# Patient Record
Sex: MALE | Race: WHITE | NOT HISPANIC OR LATINO | Employment: OTHER | URBAN - METROPOLITAN AREA
[De-identification: names, ages, dates, MRNs, and addresses within clinical notes are randomized per-mention and may not be internally consistent; named-entity substitution may affect disease eponyms.]

---

## 2017-01-24 ENCOUNTER — HOSPITAL ENCOUNTER (OUTPATIENT)
Dept: CT IMAGING | Facility: HOSPITAL | Age: 67
Discharge: HOME | End: 2017-01-24
Admitting: INTERNAL MEDICINE

## 2017-01-24 ENCOUNTER — TRANSCRIBE ORDERS (OUTPATIENT)
Dept: ADMINISTRATIVE | Facility: HOSPITAL | Age: 67
End: 2017-01-24

## 2017-01-24 DIAGNOSIS — R93.5 ABNORMAL CT SCAN, PELVIS: ICD-10-CM

## 2017-01-24 DIAGNOSIS — R94.5 LIVER FUNCTION STUDY, ABNORMAL: ICD-10-CM

## 2017-01-24 DIAGNOSIS — R11.2 NAUSEA AND VOMITING, INTRACTABILITY OF VOMITING NOT SPECIFIED, UNSPECIFIED VOMITING TYPE: ICD-10-CM

## 2017-01-24 DIAGNOSIS — R74.8 ELEVATED AMYLASE AND LIPASE: ICD-10-CM

## 2017-01-24 DIAGNOSIS — R10.9 ABDOMINAL PAIN, UNSPECIFIED LOCATION: ICD-10-CM

## 2017-01-24 DIAGNOSIS — R93.5 ABNORMAL CT SCAN, PELVIS: Primary | ICD-10-CM

## 2017-01-24 DIAGNOSIS — K86.1 CHRONIC RECURRENT PANCREATITIS (HCC): ICD-10-CM

## 2017-01-24 PROCEDURE — 74178 CT ABD&PLV WO CNTR FLWD CNTR: CPT

## 2017-01-24 PROCEDURE — 0 IOPAMIDOL PER 1 ML: Performed by: INTERNAL MEDICINE

## 2017-01-24 PROCEDURE — 82565 ASSAY OF CREATININE: CPT

## 2017-01-24 RX ADMIN — IOPAMIDOL 95 ML: 755 INJECTION, SOLUTION INTRAVENOUS at 16:10

## 2017-01-26 LAB — CREAT BLDA-MCNC: 0.7 MG/DL (ref 0.6–1.3)

## 2017-02-01 ENCOUNTER — APPOINTMENT (OUTPATIENT)
Dept: CT IMAGING | Facility: HOSPITAL | Age: 67
End: 2017-02-01

## 2018-04-12 ENCOUNTER — TRANSCRIBE ORDERS (OUTPATIENT)
Dept: ADMINISTRATIVE | Facility: HOSPITAL | Age: 68
End: 2018-04-12

## 2018-04-12 DIAGNOSIS — R06.00 DYSPNEA, UNSPECIFIED TYPE: Primary | ICD-10-CM

## 2018-04-22 ENCOUNTER — HOSPITAL ENCOUNTER (OUTPATIENT)
Dept: CT IMAGING | Facility: HOSPITAL | Age: 68
Discharge: HOME | End: 2018-04-22
Admitting: FAMILY MEDICINE

## 2018-04-22 DIAGNOSIS — R06.00 DYSPNEA, UNSPECIFIED TYPE: ICD-10-CM

## 2018-04-22 PROCEDURE — 71260 CT THORAX DX C+: CPT

## 2018-04-22 PROCEDURE — 82565 ASSAY OF CREATININE: CPT

## 2018-04-22 PROCEDURE — 25010000002 IOPAMIDOL 61 % SOLUTION: Performed by: FAMILY MEDICINE

## 2018-04-22 RX ADMIN — IOPAMIDOL 85 ML: 612 INJECTION, SOLUTION INTRAVENOUS at 10:30

## 2018-04-23 LAB — CREAT BLDA-MCNC: 0.7 MG/DL (ref 0.6–1.3)

## 2021-06-08 ENCOUNTER — APPOINTMENT (OUTPATIENT)
Dept: PREADMISSION TESTING | Facility: HOSPITAL | Age: 71
End: 2021-06-08

## 2021-06-08 ENCOUNTER — LAB (OUTPATIENT)
Dept: LAB | Facility: HOSPITAL | Age: 71
End: 2021-06-08

## 2021-06-08 ENCOUNTER — TRANSCRIBE ORDERS (OUTPATIENT)
Dept: LAB | Facility: HOSPITAL | Age: 71
End: 2021-06-08

## 2021-06-08 DIAGNOSIS — Z01.812 PRE-OPERATIVE LABORATORY EXAMINATION: Primary | ICD-10-CM

## 2021-06-08 LAB
ANION GAP SERPL CALCULATED.3IONS-SCNC: 8.7 MMOL/L (ref 5–15)
BUN SERPL-MCNC: 7 MG/DL (ref 8–23)
BUN/CREAT SERPL: 8.5 (ref 7–25)
CALCIUM SPEC-SCNC: 9.3 MG/DL (ref 8.6–10.5)
CHLORIDE SERPL-SCNC: 104 MMOL/L (ref 98–107)
CO2 SERPL-SCNC: 26.3 MMOL/L (ref 22–29)
CREAT SERPL-MCNC: 0.82 MG/DL (ref 0.76–1.27)
GFR SERPL CREATININE-BSD FRML MDRD: 93 ML/MIN/1.73
GLUCOSE SERPL-MCNC: 93 MG/DL (ref 65–99)
POTASSIUM SERPL-SCNC: 4.7 MMOL/L (ref 3.5–5.2)
SARS-COV-2 RNA PNL SPEC NAA+PROBE: NOT DETECTED
SODIUM SERPL-SCNC: 139 MMOL/L (ref 136–145)

## 2021-06-08 PROCEDURE — U0005 INFEC AGEN DETEC AMPLI PROBE: HCPCS

## 2021-06-08 PROCEDURE — C9803 HOPD COVID-19 SPEC COLLECT: HCPCS

## 2021-06-08 PROCEDURE — 36415 COLL VENOUS BLD VENIPUNCTURE: CPT | Performed by: SURGERY

## 2021-06-08 PROCEDURE — 80048 BASIC METABOLIC PNL TOTAL CA: CPT | Performed by: SURGERY

## 2021-06-08 PROCEDURE — U0004 COV-19 TEST NON-CDC HGH THRU: HCPCS

## 2022-05-25 ENCOUNTER — OFFICE VISIT (OUTPATIENT)
Dept: NEUROSURGERY | Facility: CLINIC | Age: 72
End: 2022-05-25

## 2022-05-25 ENCOUNTER — HOSPITAL ENCOUNTER (OUTPATIENT)
Dept: GENERAL RADIOLOGY | Facility: HOSPITAL | Age: 72
Discharge: HOME | End: 2022-05-25
Admitting: NEUROLOGICAL SURGERY

## 2022-05-25 VITALS — BODY MASS INDEX: 17.8 KG/M2 | TEMPERATURE: 97.7 F | HEIGHT: 69 IN | WEIGHT: 120.2 LBS

## 2022-05-25 DIAGNOSIS — M51.36 DDD (DEGENERATIVE DISC DISEASE), LUMBAR: ICD-10-CM

## 2022-05-25 DIAGNOSIS — M54.16 LUMBAR RADICULOPATHY: Primary | ICD-10-CM

## 2022-05-25 DIAGNOSIS — M54.16 LUMBAR RADICULOPATHY: ICD-10-CM

## 2022-05-25 PROCEDURE — 72114 X-RAY EXAM L-S SPINE BENDING: CPT

## 2022-05-25 PROCEDURE — 99204 OFFICE O/P NEW MOD 45 MIN: CPT | Performed by: NEUROLOGICAL SURGERY

## 2022-05-25 RX ORDER — PANCRELIPASE 24000; 76000; 120000 [USP'U]/1; [USP'U]/1; [USP'U]/1
CAPSULE, DELAYED RELEASE PELLETS ORAL
COMMUNITY

## 2022-05-25 RX ORDER — IBUPROFEN 800 MG/1
800 TABLET ORAL 3 TIMES DAILY PRN
COMMUNITY
Start: 2022-05-19

## 2022-05-25 RX ORDER — GABAPENTIN 300 MG/1
CAPSULE ORAL
Qty: 90 CAPSULE | Refills: 0 | Status: SHIPPED | OUTPATIENT
Start: 2022-05-25 | End: 2022-06-28 | Stop reason: SDUPTHER

## 2022-05-25 NOTE — PROGRESS NOTES
Patient: Edd Bautista  : 1950    Primary Care Provider: Mukund Aguillon MD    Requesting Provider: As above        History    Chief Complaint: Increasing low back and left leg pain.    History of Present Illness: Mr. Bautista is a 71-year-old retired  who apparently saw my former colleague Dr. Mar years ago for neck problems.  He had numerous injections performed in his neck.  He has had chronic low back pain that has increased.  Over the last 2 to 3 months he has had an increase in his back pain that extends into the side of his left calf.  Symptoms are worse with walking or even lying on his back.  There is really no position in which he can get comfortable.  He has chronically had some numbness in his feet greater on the left.  He has not undergone any formal treatment except for oral steroids which were not helpful.  He smokes heavily.    Review of Systems   Constitutional: Negative for activity change, appetite change, chills, diaphoresis, fatigue, fever and unexpected weight change.   HENT: Positive for hearing loss. Negative for congestion, dental problem, drooling, ear discharge, ear pain, facial swelling, mouth sores, nosebleeds, postnasal drip, rhinorrhea, sinus pressure, sinus pain, sneezing, sore throat, tinnitus, trouble swallowing and voice change.    Eyes: Negative for photophobia, pain, discharge, redness, itching and visual disturbance.   Respiratory: Negative for apnea, cough, choking, chest tightness, shortness of breath, wheezing and stridor.    Cardiovascular: Negative for chest pain, palpitations and leg swelling.   Gastrointestinal: Negative for abdominal distention, abdominal pain, anal bleeding, blood in stool, constipation, diarrhea, nausea, rectal pain and vomiting.   Endocrine: Negative for cold intolerance, heat intolerance, polydipsia, polyphagia and polyuria.   Genitourinary: Negative for decreased urine volume, difficulty urinating, dysuria, enuresis, flank pain,  "frequency, genital sores, hematuria and urgency.   Musculoskeletal: Positive for back pain. Negative for arthralgias, gait problem, joint swelling, myalgias, neck pain and neck stiffness.   Skin: Negative for color change, pallor, rash and wound.   Allergic/Immunologic: Negative for environmental allergies, food allergies and immunocompromised state.   Neurological: Negative for dizziness, tremors, seizures, syncope, facial asymmetry, speech difficulty, weakness, light-headedness, numbness and headaches.   Hematological: Negative for adenopathy. Does not bruise/bleed easily.   Psychiatric/Behavioral: Negative for agitation, behavioral problems, confusion, decreased concentration, dysphoric mood, hallucinations, self-injury, sleep disturbance and suicidal ideas. The patient is not nervous/anxious and is not hyperactive.    All other systems reviewed and are negative.      The patient's past medical history, past surgical history, family history, and social history have been reviewed at length in the electronic medical record.      Physical Exam:   Temp 97.7 °F (36.5 °C)   Ht 175.3 cm (69\")   Wt 54.5 kg (120 lb 3.2 oz)   BMI 17.75 kg/m²   CONSTITUTIONAL: Patient is quite thin and appears somewhat older than his stated age.  MUSCULOSKELETAL:  Straight leg raising is negative.  Julio's Sign is negative.  ROM in the low back is normal.  Tenderness in the back to palpation is not observed.  NEUROLOGICAL:  Orientation, memory, attention span, language function, and cognition have been examined and are intact.  Strength is intact in the lower extremities to direct testing.  Muscle tone is normal throughout.  Station and gait are normal.  Sensation is intact to light touch testing throughout.  Deep tendon reflexes are 1+ and symmetrical.  Coordination is intact.      Medical Decision Making    Data Review:   (All imaging studies were personally reviewed unless stated otherwise)  MRI of the lumbar spine dated 5/5/22 " demonstrates some degenerative disc disease and facet arthropathy.  There may be a trace offset of L3 on L4.  There is some foraminal to slightly extraforaminal disc bulging/osteophyte on the left at L3-4.  Mild bilateral recess narrowing is noted at L4-5.  And there is probably some extraforaminal disc herniation on the left at L5-S1 that could compromise the L5 nerve root.  There is also biforaminal narrowing at L5-S1 greater on the left.    Diagnosis:   1.  Chronic mechanical low back pain.  2.  Possible L5 radiculopathy probably emanating far laterally on the left at L5-S1.    Treatment Options:   I have referred the patient to physical therapy and prescribed gabapentin.  Prior to follow-up in several weeks in our clinic I am going to check flexion-extension upright lateral lumbar spine x-rays to assess for instability.  If he continues to struggle then we might consider a selective epidural injection.       Diagnosis Plan   1. Lumbar radiculopathy     2. DDD (degenerative disc disease), lumbar         Scribed for Noah Farias MD by Enedelia Rosen MYKEL 5/25/2022 12:17 EDT      I, Dr. Farias, personally performed the services described in the documentation, as scribed in my presence, and it is both accurate and complete.

## 2022-06-28 ENCOUNTER — OFFICE VISIT (OUTPATIENT)
Dept: NEUROSURGERY | Facility: CLINIC | Age: 72
End: 2022-06-28

## 2022-06-28 VITALS — BODY MASS INDEX: 18.1 KG/M2 | HEIGHT: 69 IN | TEMPERATURE: 97.5 F | WEIGHT: 122.2 LBS

## 2022-06-28 DIAGNOSIS — M54.16 LEFT LUMBAR RADICULOPATHY: Primary | ICD-10-CM

## 2022-06-28 DIAGNOSIS — M54.16 LUMBAR RADICULOPATHY: ICD-10-CM

## 2022-06-28 DIAGNOSIS — M51.36 DDD (DEGENERATIVE DISC DISEASE), LUMBAR: ICD-10-CM

## 2022-06-28 PROBLEM — M51.369 DDD (DEGENERATIVE DISC DISEASE), LUMBAR: Status: ACTIVE | Noted: 2022-06-28

## 2022-06-28 PROCEDURE — 99213 OFFICE O/P EST LOW 20 MIN: CPT | Performed by: PHYSICIAN ASSISTANT

## 2022-06-28 RX ORDER — GABAPENTIN 300 MG/1
300 CAPSULE ORAL 3 TIMES DAILY
Qty: 45 CAPSULE | Refills: 0 | Status: SHIPPED | OUTPATIENT
Start: 2022-06-28 | End: 2022-10-21 | Stop reason: SDUPTHER

## 2022-06-28 NOTE — PROGRESS NOTES
"Subjective     Chief Complaint: increasing low back and left leg pain    Patient ID: Edd Bautista is a 71 y.o. male is here today for follow-up.    History of Present Illness   Mr. Bautista is a 71-year-old retired  who saw  Dr. Mar years ago for neck problems.  He had numerous injections performed in his neck.  He saw Dr. Farias in May for increasing back pain radiating to his left lateral calf worse with walking or lying on his back but uncomfortable in all positions.MRI of the lumbar spine was reviewed which showed multilevel degenerative changes including a left L5-S1 far lateral disc herniation.  He was referred for physical therapy and gabapentin was prescribed.  Dr. Farias recommended steroid injection if he continued to struggle.  Flexion-extension x-rays were also ordered to assess for instability.     Today, patient states that he attended physical therapy for 3 weeks.  This did not give him any relief.  He has been taking the gabapentin and it is helping somewhat.  He does need a refill today.  He continues to have the leg pain especially when twisting to the left or turning a certain way.  When he does this he feels that his leg almost \"gives out\".  He responded well to cervical injections in the past with his neck pain and is interested in pursuing pain management here.  .    The following portions of the patient's history were reviewed and updated as appropriate: allergies, current medications, past family history, past medical history, past social history, past surgical history and problem list.    Family history:   Family History   Problem Relation Age of Onset   • Brain cancer Mother        Social history:   Social History     Socioeconomic History   • Marital status:    Tobacco Use   • Smoking status: Current Every Day Smoker     Packs/day: 1.00     Years: 35.00     Pack years: 35.00   • Smokeless tobacco: Never Used   Vaping Use   • Vaping Use: Never used   Substance and Sexual " Activity   • Alcohol use: Yes     Alcohol/week: 3.0 standard drinks     Types: 3 Cans of beer per week   • Drug use: Never   • Sexual activity: Defer       Review of Systems   Constitutional: Negative for activity change, appetite change, chills, diaphoresis, fatigue, fever and unexpected weight change.   HENT: Negative for congestion, dental problem, drooling, ear discharge, ear pain, facial swelling, hearing loss, mouth sores, nosebleeds, postnasal drip, rhinorrhea, sinus pressure, sinus pain, sneezing, sore throat, tinnitus, trouble swallowing and voice change.    Eyes: Negative for photophobia, pain, discharge, redness, itching and visual disturbance.   Respiratory: Negative for apnea, cough, choking, chest tightness, shortness of breath, wheezing and stridor.    Cardiovascular: Negative for chest pain, palpitations and leg swelling.   Gastrointestinal: Negative for abdominal distention, abdominal pain, anal bleeding, blood in stool, constipation, diarrhea, nausea, rectal pain and vomiting.   Endocrine: Negative for cold intolerance, heat intolerance, polydipsia, polyphagia and polyuria.   Genitourinary: Negative for decreased urine volume, difficulty urinating, dysuria, enuresis, flank pain, frequency, genital sores, hematuria and urgency.   Musculoskeletal: Positive for back pain. Negative for arthralgias, gait problem, joint swelling, myalgias, neck pain and neck stiffness.   Skin: Negative for color change, pallor, rash and wound.   Allergic/Immunologic: Negative for environmental allergies, food allergies and immunocompromised state.   Neurological: Negative for dizziness, tremors, seizures, syncope, facial asymmetry, speech difficulty, weakness, light-headedness, numbness and headaches.   Hematological: Negative for adenopathy. Does not bruise/bleed easily.   Psychiatric/Behavioral: Negative for agitation, behavioral problems, confusion, decreased concentration, dysphoric mood, hallucinations, self-injury,  "sleep disturbance and suicidal ideas. The patient is not nervous/anxious and is not hyperactive.    All other systems reviewed and are negative.      Objective   Temperature 97.5 °F (36.4 °C), height 175.3 cm (69.02\"), weight 55.4 kg (122 lb 3.2 oz).  Body mass index is 18.04 kg/m².    Physical Exam  CONSTITUTIONAL:   - Patient is well-nourished  - Pleasant and appears stated age.  PSYCHIATRIC:  - Normal mood and affect  - Behavior is normal.  HENT:   Head: Normocephalic and Atraumatic.   Eyes:     - Pupils are equal, round, and reactive to light.     - EOM are normal.   CV:   - Regular rate and rhythm on palpable radial pulse   PULMONARY:   - Speaking in full sentences, breathing non labored  - No wheezing   SKIN:  - Clean, dry and intact   MUSCULOSKELETAL:  - Neck/Back tenderness to palpation is not observed.   - Strength is intact in the upper and lower extremities to direct testing.  - Muscle tone is normal throughout.  - Station and gait are normal.  - ROM in neck/back is normal.  NEUROLOGICAL:  - A&Ox3  - Attention span, language function, and cognition are intact.  - Sensation is intact to light touch testing throughout.  - Deep tendon reflexes are 1+ and symmetrical.    - Lovett's Sign is negative bilaterally.  - Coordination is intact.      Flexion-extension x-rays dated 5/25/2022 were reviewed.  These show degenerative changes as mentioned in MRI, but no evidence of instability in flexion or extension.  Scattered abdominal calcifications were noted.  Radiologist read these as pancreatic calcifications.  These findings were reviewed with the patient in the exam room, and his x-ray results were reviewed with him.  He has known pancreatic issues and sees gastroenterology in [place]      Assessment & Plan   We will refer Mr. Bautista for pain management.  He would like to see by Dr. Brewer here despite his long drive.  His son lives in [place] and they visit often.  We we will also refill his gabapentin.  " He will follow-up with us on an as-needed basis after completing injections.  If his pain persists, we will order a myelogram    Diagnoses and all orders for this visit:    1. Left lumbar radiculopathy (Primary)  -     Ambulatory Referral to Pain Management    2. DDD (degenerative disc disease), lumbar  -     Ambulatory Referral to Pain Management        Return if symptoms worsen or fail to improve.    NATE VelizC

## 2022-07-13 ENCOUNTER — TELEPHONE (OUTPATIENT)
Dept: PAIN MEDICINE | Facility: CLINIC | Age: 72
End: 2022-07-13

## 2022-07-14 NOTE — PROGRESS NOTES
"Chief Complaint: \"Pain in my lower back and left leg\"        History of Present Illness:   Patient: Mr. Edd Bautista, 71 y.o. male   Referring Physician: Kasey Gonzalez PA-C   Reason for Referral: Consultation for chronic intractable lower back and left lower extremity pain.   Pain History: Patient reports a longstanding history of posterior neck and lower back issues. He is a retired . He saw Dr. aMr several years ago for neck problems and had numerous injections with Dr. Paul . He saw Dr. Farias in May 2022 in consultation for increasing lower back pain radiating into the left lateral calf associated with intolerance to standing and walking although he reported to be uncomfortable in all positions. MRI of the lumbar spine on 5/5/2022 revealed diffuse degenerative disc and facet joint disease. There is trace of facet of L3 on L4 with foraminal and slightly extraforaminal disc bulging/osteophyte on the left at L3-L4.  Mild bilateral lateral recess stenosis at L4-L5.  At L5-S1, there is a a far lateral left for disc herniation contributing to severe neuroforaminal stenosis with compromise the left L5 nerve root.  Bilateral foraminal stenosis at L5-S1, greater on the left. Flexion-extension x-rays on 5/25/2022 revealed no evidence of instability in flexion or extension.  Moderate degenerative disc and facet joint disease in the lower lumbar region.   Patient has failed to obtain pain relief with conservative measures for more than 3 months including oral analgesics, physical therapy for several weeks without relief, to name a few. Edd Bautista underwent neurosurgical consultation with Kasey Gonzalez PA-C on 06/28/2022, and was found not to be a surgical candidate.  The neurosurgical team recommended maximization of conservative measures and a follow-up on an as-needed basis after completing injections.  If pain persists, they have discussed the possibility of obtaining a myelogram for surgical delineation. " Pain has progressed in intensity over the past several months.   Pain Description: Constant left lower back pain with intermittent exacerbation, described as aching, burning, sharp, and shooting sensation. Left luteal pain > Left calf > Left lower back  Radiation of Pain: The pain radiates into the left gluteal region, left posterior thigh, posterior calf, and plantar aspect of his left foot and toes. Patient reports low grade right-sided LBP/denies RLE pain.   Pain pattern: Dermatomic   Pain intensity today: 3/10  Average pain intensity last week: 5/10  Pain intensity ranges from: 3/10 to 10/10  Aggravating factors: Pain increases with bending, twisting, extension of the lumbar spine, standing, walking. Patient describes neurogenic claudication. Patient does not use a cane or walker  Alleviating factors: Pain decreases with changing positions, lying down on his left side  Associated Symptoms:   Patient reports pain, numbness, and weakness in the left lower extremity. Numbness in the right foot (Hx neuropathy)   Patient denies any new bladder or bowel problems.   Patient reports difficulties with his balance but denies recent falls.   Pain interferes with ADLs, general activities (ability to walk, stand, transition from different positions), and affects patient's quality of life  Pain interferes with sleep causing sleep fragmentation   Muscle spasms: back  Stiffness: back    Review of previous therapies and additional medical records:  Edd Bautista has already failed the following measures, including:   Conservative Measures: Oral analgesics, topical analgesics, ice, heat, exercise program by PT, physical therapy   Interventional Measures: None for his lower back. Last injection for neck pain > 10 years ago by Dr. Paul  Surgical Measures: No history of previous cervical spine or shoulder surgery. No history of previous lumbar spine or hip surgery   Edd Bautista underwent neurosurgical consultation with Kasey  DALLAS Gonzalez on 06/28/2022, and was found not to be a surgical candidate.  Edd Bautista presents with significant comorbidities including a history of oral cancer, heavy smoker,  In terms of current analgesics, Edd Bautista takes: Gabapentin, ibuprofen  I have reviewed Vijay Report consistent with medication reconciliation.  SOAPP: High Risk     PHQ-2 Depression Screening  Little interest or pleasure in doing things? 0-->not at all   Feeling down, depressed, or hopeless? 0-->not at all   PHQ-2 Total Score 0     Patient screened negative for depression based on a PHQ-2 score of 0 on 07/21/2022    Global Pain Scale 07-19 2022          Pain 18          Feelings 0          Clinical outcomes 20          Activities 20          GPS Total: 58            The [place] Back Pain Disability Scale   0  Not difficult at all 1  Minimally difficult 2  Somewhat difficult 3  Fairly difficult 4  Very difficult 5  Unable to do   Sleep through the night      5   Turn over in bed      5   Get out of bed      5   Make your bed 0        Put on socks (pantyhose) 0        Ride in a car   2      Sit in a chair for several hours   2      Stand up for 20-30 minutes      5   Climb one flight of stairs      5   Walk a few blocks (200-300 yards)       5   Walk several miles      5   Run one block (about 50 yards)      5   Take food out of the refrigerator 0        Reach up to high shelves 0        Move a chair 0        Pull or push heavy doors 0        Bend over to clean the bathtub      5   Throw a ball 0        Carry two bags of groceries     4    Lift and carry a heavy suitcase      5   Total score 58     Review of Diagnostic Studies: I have reviewed the images with the patient and used the images and a tridimensional spine model to explain findings. I have reviewed the report, as well.  MRI of the lumbar spine on 5/5/2022 revealed diffuse degenerative disc and facet joint disease.  There is trace of facet of L3 on L4 with foraminal and  slightly extraforaminal disc bulging/osteophyte on the left at L3-4.  Mild bilateral lateral recess stenosis at L4-L5.  At L5-S1, there is a possible extraforaminal disc herniation on the left side that could compromise the left L5 nerve root.  There is also bilateral foraminal stenosis at L5-S1, greater on the left.  X-rays of the lumbar spine including flexion-extension films on 5/25/2022 revealed no evidence of instability during flexion or extension.  Moderate degenerative disc and facet joint disease in the lower lumbar region.    EMG/NCV by Dr. Woody Hall on 8/7/2015 revealed left ulnar neuropathy at the elbow       Review of Systems   Musculoskeletal: Positive for back pain.   All other systems reviewed and are negative.        Patient Active Problem List   Diagnosis   • DDD (degenerative disc disease), lumbar   • Lumbar disc herniation with radiculopathy   • Lumbar stenosis with neurogenic claudication   • Encounter for smoking cessation counseling   • Physical deconditioning   • Gait disturbance   • Current every day smoker   • Myofascial pain   • Lumbar facet arthropathy   • Hx of transient thrombocytopenia       Past Medical History:   Diagnosis Date   • Acquired thrombocytopenia (HCC)    • Back problem    • Black lung (HCC)    • Cancer (HCC)     oral, now in remission   • Dysfunction of left eustachian tube    • Hernia of abdominal wall    • HL (hearing loss)    • Inguinal hernia    • Malignant tumor of oral cavity (HCC)    • Perforation of left tympanic membrane    • Perforation of nasal septum    • Tinnitus of both ears          Past Surgical History:   Procedure Laterality Date   • ABDOMINAL HERNIA REPAIR  2021   • INNER EAR SURGERY Left 07/15/2019    LEFT MICROSCOPIC TYMPANOPLASTY, POSTAURICULAR HARVEST OF TEMPORALIS FASCIA GRAFT Dr. Lui   • SKIN CANCER EXCISION  2021    Skin and mouth cancer removed   • THROAT SURGERY Left 09/28/2011    Excision of left floor of mouth/ventral tongue  "Canadian's duct sphincter Dr Woody Lui         Family History   Problem Relation Age of Onset   • Brain cancer Mother          Social History     Socioeconomic History   • Marital status:    Tobacco Use   • Smoking status: Current Every Day Smoker     Packs/day: 1.00     Years: 35.00     Pack years: 35.00   • Smokeless tobacco: Never Used   Vaping Use   • Vaping Use: Never used   Substance and Sexual Activity   • Alcohol use: Yes     Alcohol/week: 3.0 standard drinks     Types: 3 Cans of beer per week   • Drug use: Never   • Sexual activity: Defer           Current Outpatient Medications:   •  gabapentin (NEURONTIN) 300 MG capsule, Take 1 capsule by mouth 3 (Three) Times a Day., Disp: 45 capsule, Rfl: 0  •  ibuprofen (ADVIL,MOTRIN) 800 MG tablet, Take 800 mg by mouth 3 (Three) Times a Day As Needed., Disp: , Rfl:   •  pancrelipase, Lip-Prot-Amyl, (Creon) 29142-15930 units capsule delayed-release particles capsule, Creon 24,000-76,000-120,000 unit capsule,delayed release  TAKE ONE CAPSULE BY MOUTH WITH SNACKS AND TAKE TWO CAPSULES BY MOUTH WITH MEALS, Disp: , Rfl:       No Known Allergies      /78   Pulse 80   Temp 95.9 °F (35.5 °C)   Ht 175.3 cm (69.02\")   Wt 56.2 kg (124 lb)   SpO2 98%   BMI 18.30 kg/m²       Physical Exam:  Constitutional: Patient appears well-developed, well-nourished, well-hydrated  HEENT: Head: Normocephalic and atraumatic  Eyes: Conjunctivae and lids are normal  Pupils: Equal, round, reactive to light  Peripheral vascular exam: Femoral: right 2+, left 2+. Posterior tibialis: right 2+ and left 2+. Dorsalis pedis: right 2+ and left 2+. No edema.   Musculoskeletal   Gait and station: Gait evaluation demonstrated shuffling. Able to walk on heels, toes, some difficulties with tandem walking   Lumbar spine: Passive and active range of motion are limited secondary to pain. Extension, flexion, lateral flexion, rotation of the lumbar spine increased and reproduced pain. Lumbar " facet joint loading maneuvers are positive.  Julio test, Gaenslen's test, thigh thrust test, SI compression test, Yeoman's test are negative   Piriformis maneuvers are negative   Hip joints: The range of motion of the hip joints is almost full and without pain   Palpation of the bilateral greater trochanter, unrevealing   Examination of the Iliotibial band: unrevealing   Neurological:   Patient is alert and oriented to person, place, and time.   Speech: Normal.   Cortical function: Normal mental status.   Reflex Scores:  Right brachioradialis: 2+  Left brachioradialis: 2+  Right biceps: 2+  Left biceps: 2+  Right triceps: 2+  Left triceps: 2+  Right patellar: 0+  Left patellar: 0+  Right Achilles: 0+  Left Achilles: 0+  Motor strength: 5/5  Motor Tone: Normal  Involuntary movements: None.   Superficial/Primitive Reflexes: Primitive reflexes were absent.   Right Lovett: Absent  Left Lovett: Absent  Right ankle clonus: Absent  Left ankle clonus: Absent   Babinsky: Absent  Long tract signs: Negative. Straight leg raising test: Negative on the right. Positive on the left at 30-40 degrees with positive Lasegue. Femoral stretch sign: Negative.   Sensory exam: Intact to light touch, intact pain and temperature sensation, intact vibration sensation and normal proprioception.   Coordination: Normal finger to nose and heel to shin. Normal balance and negative Romberg's sign   Skin and subcutaneous tissue: Skin is warm and intact. No rash noted. No cyanosis.   Psychiatric: Judgment and insight: Normal. Recent and remote memory: Intact. Mood and affect: Normal.     ASSESSMENT:   1. Lumbar stenosis with neurogenic claudication    2. Lumbar disc herniation with radiculopathy    3. Lumbar facet arthropathy    4. Myofascial pain    5. Hx of transient thrombocytopenia    6. Current every day smoker    7. Gait disturbance    8. Physical deconditioning    9. Encounter for smoking cessation counseling          PLAN/MEDICAL DECISION  MAKING:  Mr. Edd Bautista, 71 y.o. male presents with a longstanding history of chronic lower back pain refractory to conservative measures. He also has a history of posterior cervicalgia, for which he has seen Dr. Mar several years ago and had several injections with Dr. Paul. He saw Dr. Farias in May 2022 in consultation for increasing lower back pain radiating into the left lateral calf associated with intolerance to standing and walking. MRI of the lumbar spine on 5/5/2022 revealed diffuse moderate degenerative disc and facet joint disease in the lower lumbar region.  There is trace of facet of L3 on L4 with foraminal and slightly extraforaminal disc bulging/osteophyte on the left at L3-L4.  Mild bilateral lateral recess stenosis at L4-L5.  At L5-S1, there is a a far lateral left for disc herniation contributing to severe neuroforaminal stenosis with compromise the left L5 nerve root. Bilateral foraminal stenosis at L5-S1, greater on the left. Flexion-extension x-rays on 5/25/2022 revealed no evidence of instability in flexion or extension.  Patient has failed to obtain pain relief with conservative measures for more than 3 months including oral analgesics, physical therapy for several weeks without relief, to name a few. Edd Bautista underwent neurosurgical consultation with Kasey Gonzalez PA-C on 06/28/2022, and was found not to be a surgical candidate.  The neurosurgical team recommended maximization of conservative measures and a follow-up on an as-needed basis after completing injections.  If pain persists, they have discussed the possibility of obtaining a myelogram for surgical delineation. Pain has progressed in intensity over the past several months. Pain appears to be mostly neuropathic with a left L5-S1 dermatomal distribution. He also has mechanical lower back pain. A comprehensive initial evaluation including history and physical exam were performed including pertinent physiologic and  functional assessment. Patient presents with intractable pain due to the diagnoses listed above. Patient has failed to respond to conservative modalities, as referenced under HPI including the impact of patient's moderate-to-severe pain contributing to significant impairment in daily activities, ADLs, and a negative impact on quality of life. Supporting diagnostic studies of patient's chronic pain condition have been reviewed. I have reviewed all available patient's medical records as well as previous therapies as referenced above. I had a lengthy conversation with . Edd Kelly Freemans regarding his chronic pain condition and potential therapeutic options including risks, benefits, alternative therapies, to name a few. We have discussed using a stepwise approach starting with the shortest or least intense level of treatment, care, or service as determined by the extent required to diagnose and or treat patient's condition. The treatments proposed are consistent with the patient's medical condition and are known to be as safe and effective by current guidelines and standard of care. There is no evidence of absolute contraindications for the proposed procedures under the current circumstances. These treatments are not considered experimental or investigational. The duration and frequency proposed are considered appropriate for the service in accordance with accepted standards of medical practice for the diagnosis and or treatment of the patient's condition and or intended to improve the patient's level of function. These services will be furnished in a setting appropriate to the patient's medical needs and condition. Therefore, I have proposed the following plan:  1. Interventional pain management measures: Patient will be scheduled for diagnostic and therapeutic left L5-S1 and left S1 transforaminal epidural steroid injections. We may repeat epidurals depending on patient's outcome.  Patient will follow-up with   Jarrett thereafter. He also has extensive facet hypertrophy at all levels but mainly at L3-L4 and L4-L5 with chronic axial mechanical lower back pain. We have discussed diagnostic bilateral lumbar medial branch blocks at L2, L3, L4; for bilateral lumbar facet joints at L3-L4, L4-L5, to clarify the origin of chronic refractory mechanical lower back pain. If patient experiences more than 80% relief along with significant improvement the range of motion of the lumbar spine, then, patient will be scheduled for a second set of diagnostic bilateral lumbar medial branch blocks, to then, proceed with bilateral lumbar medial branch rhizotomies. He could also be a candidate for a spinal cord stimulator trial if he is found not to be a surgical candidate  2. Diagnostic studies:  A. CMP, LFTs, CBC, PT, PTT (Hx thrombocytopenia)   B. EMG/NCV of the bilateral and lower extremities   3. Pharmacological measures: Reviewed and discussed;   A. Patient takes gabapentin, ibuprofen  B. Trial with Rheumate one tablet daily  C. Start pyridoxine 100 mg one tablet by mouth daily, #30, take for 30 days, no refills  D. Start alpha lipoid acid 4943-6424 mg per day divided into 3 doses  E. Trial with prilocaine 2%, lidocaine 10%, imipramine 3%, capsaicin 0.001% and mannitol 20% cream, apply 1 to 2 grams of cream to the affected areas every 4 to 6 hours as needed  F. Trial with nortriptyline 10 mg 1 tablet at bedtime, #30, no refills. We could titrate slowly according to response  4. Long-term rehabilitation efforts:  A. The patient does not have a history of falls but has risk factors for falls . I did complete a risk assessment for falls. Fall precautions: Patient has been instructed regarding universal fall precautions, such as;   • Using gait aids a cane or a rolling walker at the appropriate height at all times for ambulation   • Removing all area rugs and coffee tables to create a safe environment at home  • Ensure clean, dry  floors  • Wearing supportive footwear and properly fitting clothing  • Ensure bed/chair is appropriate height and patient's feet can touch the floor  • Using a shower transfer bench  • Using walk-in shower and having shower safety bars installed  • Ensure proper lighting, minimize glare  • Have nightlights operational and in use  • Participation in an exercise program for gait training, balance training and strength  • Avoid carrying laundry up and down steps  • Ensure proper compliance and organization of medications to avoid errors   • Avoid use of over the counter sedatives and alcohol consumption  • Ensure easy access to call bell, glasses, TV control, telephone  • Ensure glasses/hearing aids are in use or close by (on top of night table)  B.  Patient will start a comprehensive physical therapy program for reconditioning, water therapy, Alter-G, core strengthening, gait and balance training, neurodynamics, E-STIM, myofascial release, cupping, dry needling, home exercises  C.  Start an exercise program such as yoga, water therapy and swimming  D. Trial with TENS unit/interferential unit  E. Contrast therapy: Apply ice-packs for 15-20 minutes, followed by heating pads for 15-20 minutes to affected area   JACINDA Puga Kelly Michele  reports that he has been smoking. He has a 35.00 pack-year smoking history. He has never used smokeless tobacco. I have educated him on the risk of diseases from using tobacco products such as cancer, COPD and heart disease. I advised him to quit and he is willing to quit. We have discussed the following method/s for tobacco cessation: Nicotine replacement therapy. I spent 6 minutes counseling the patient. Start nicotine patches 21 mg one patch at [facility] daily for 4 weeks, then Nicotine patches 14 mg one patch at QHS daily for 4 weeks, then Nicotine patches 7 mg one patch at Q[facility] daily for 4 weeks, then, discontinue. Patient has been instructed to cut down or stop as he starts nicotine replacement  therapy  5. The patient has been instructed to contact my office with any questions or difficulties. The patient understands the plan and agrees to proceed accordingly.    The patient has a documented plan of care to address chronic pain. Edd Bautista reports a pain score of 3/10.  Given his pain assessment as noted, treatment options were discussed and the following options were decided upon as a follow-up plan to address the patient's pain: continuation of current treatment plan for pain, educational materials on pain management, home exercises and therapy, prescription for non-opiod analgesics, referral to Physical Therapy, steroid injections, use of non-medical modalities (ice, heat, stretching and/or behavior modifications) and Interventional pain management measures.           Pain Management Panel    There is no flowsheet data to display.          JOVANNY query complete. JOVANNY reviewed by Pawel Brewer MD.     Pain Medications             gabapentin (NEURONTIN) 300 MG capsule Take 1 capsule by mouth 3 (Three) Times a Day.    ibuprofen (ADVIL,MOTRIN) 800 MG tablet Take 800 mg by mouth 3 (Three) Times a Day As Needed.         Please note that portions of this note were completed with a voice recognition program.     Pawel Brewer MD    Patient Care Team:  Mukund Aguillon MD as PCP - General  Mukund Aguillon MD as PCP - Family Medicine  Pawel Brewer MD as Consulting Physician (Pain Medicine)     No orders of the defined types were placed in this encounter.        No future appointments.

## 2022-07-18 PROBLEM — M51.16 LUMBAR DISC HERNIATION WITH RADICULOPATHY: Status: ACTIVE | Noted: 2022-06-28

## 2022-07-18 PROBLEM — M48.062 LUMBAR STENOSIS WITH NEUROGENIC CLAUDICATION: Status: ACTIVE | Noted: 2022-07-18

## 2022-07-21 ENCOUNTER — OFFICE VISIT (OUTPATIENT)
Dept: PAIN MEDICINE | Facility: CLINIC | Age: 72
End: 2022-07-21

## 2022-07-21 ENCOUNTER — LAB (OUTPATIENT)
Dept: LAB | Facility: HOSPITAL | Age: 72
End: 2022-07-21

## 2022-07-21 VITALS
HEIGHT: 69 IN | TEMPERATURE: 95.9 F | OXYGEN SATURATION: 98 % | HEART RATE: 80 BPM | WEIGHT: 124 LBS | DIASTOLIC BLOOD PRESSURE: 78 MMHG | SYSTOLIC BLOOD PRESSURE: 136 MMHG | BODY MASS INDEX: 18.37 KG/M2

## 2022-07-21 DIAGNOSIS — M48.062 LUMBAR STENOSIS WITH NEUROGENIC CLAUDICATION: Primary | ICD-10-CM

## 2022-07-21 DIAGNOSIS — Z71.6 ENCOUNTER FOR SMOKING CESSATION COUNSELING: ICD-10-CM

## 2022-07-21 DIAGNOSIS — M47.816 LUMBAR FACET ARTHROPATHY: ICD-10-CM

## 2022-07-21 DIAGNOSIS — M79.18 MYOFASCIAL PAIN: ICD-10-CM

## 2022-07-21 DIAGNOSIS — M48.062 LUMBAR STENOSIS WITH NEUROGENIC CLAUDICATION: ICD-10-CM

## 2022-07-21 DIAGNOSIS — M51.16 LUMBAR DISC HERNIATION WITH RADICULOPATHY: ICD-10-CM

## 2022-07-21 DIAGNOSIS — R53.81 PHYSICAL DECONDITIONING: ICD-10-CM

## 2022-07-21 DIAGNOSIS — Z86.2: ICD-10-CM

## 2022-07-21 DIAGNOSIS — F17.200 CURRENT EVERY DAY SMOKER: ICD-10-CM

## 2022-07-21 DIAGNOSIS — R26.9 GAIT DISTURBANCE: ICD-10-CM

## 2022-07-21 LAB
ALBUMIN SERPL-MCNC: 4.6 G/DL (ref 3.5–5.2)
ALBUMIN/GLOB SERPL: 1.6 G/DL
ALP SERPL-CCNC: 54 U/L (ref 39–117)
ALT SERPL W P-5'-P-CCNC: 21 U/L (ref 1–41)
ANION GAP SERPL CALCULATED.3IONS-SCNC: 12 MMOL/L (ref 5–15)
APTT PPP: 28.7 SECONDS (ref 22–39)
AST SERPL-CCNC: 32 U/L (ref 1–40)
BASOPHILS # BLD AUTO: 0.03 10*3/MM3 (ref 0–0.2)
BASOPHILS NFR BLD AUTO: 0.5 % (ref 0–1.5)
BILIRUB CONJ SERPL-MCNC: 0.2 MG/DL (ref 0–0.3)
BILIRUB SERPL-MCNC: 0.7 MG/DL (ref 0–1.2)
BUN SERPL-MCNC: 12 MG/DL (ref 8–23)
BUN/CREAT SERPL: 13.8 (ref 7–25)
CALCIUM SPEC-SCNC: 9.6 MG/DL (ref 8.6–10.5)
CHLORIDE SERPL-SCNC: 100 MMOL/L (ref 98–107)
CO2 SERPL-SCNC: 25 MMOL/L (ref 22–29)
CREAT SERPL-MCNC: 0.87 MG/DL (ref 0.76–1.27)
DEPRECATED RDW RBC AUTO: 42 FL (ref 37–54)
EGFRCR SERPLBLD CKD-EPI 2021: 93.4 ML/MIN/1.73
EOSINOPHIL # BLD AUTO: 0.09 10*3/MM3 (ref 0–0.4)
EOSINOPHIL NFR BLD AUTO: 1.6 % (ref 0.3–6.2)
ERYTHROCYTE [DISTWIDTH] IN BLOOD BY AUTOMATED COUNT: 12 % (ref 12.3–15.4)
GLOBULIN UR ELPH-MCNC: 2.8 GM/DL
GLUCOSE SERPL-MCNC: 98 MG/DL (ref 65–99)
HCT VFR BLD AUTO: 43.4 % (ref 37.5–51)
HGB BLD-MCNC: 15.1 G/DL (ref 13–17.7)
IMM GRANULOCYTES # BLD AUTO: 0.02 10*3/MM3 (ref 0–0.05)
IMM GRANULOCYTES NFR BLD AUTO: 0.4 % (ref 0–0.5)
INR PPP: 1.02 (ref 0.84–1.13)
LYMPHOCYTES # BLD AUTO: 1.94 10*3/MM3 (ref 0.7–3.1)
LYMPHOCYTES NFR BLD AUTO: 35.1 % (ref 19.6–45.3)
MCH RBC QN AUTO: 33.9 PG (ref 26.6–33)
MCHC RBC AUTO-ENTMCNC: 34.8 G/DL (ref 31.5–35.7)
MCV RBC AUTO: 97.3 FL (ref 79–97)
MONOCYTES # BLD AUTO: 0.76 10*3/MM3 (ref 0.1–0.9)
MONOCYTES NFR BLD AUTO: 13.7 % (ref 5–12)
NEUTROPHILS NFR BLD AUTO: 2.69 10*3/MM3 (ref 1.7–7)
NEUTROPHILS NFR BLD AUTO: 48.7 % (ref 42.7–76)
NRBC BLD AUTO-RTO: 0 /100 WBC (ref 0–0.2)
PLATELET # BLD AUTO: 182 10*3/MM3 (ref 140–450)
PMV BLD AUTO: 9.6 FL (ref 6–12)
POTASSIUM SERPL-SCNC: 4.9 MMOL/L (ref 3.5–5.2)
PROT SERPL-MCNC: 7.4 G/DL (ref 6–8.5)
PROTHROMBIN TIME: 13.3 SECONDS (ref 11.4–14.4)
RBC # BLD AUTO: 4.46 10*6/MM3 (ref 4.14–5.8)
SODIUM SERPL-SCNC: 137 MMOL/L (ref 136–145)
WBC NRBC COR # BLD: 5.53 10*3/MM3 (ref 3.4–10.8)

## 2022-07-21 PROCEDURE — 85610 PROTHROMBIN TIME: CPT

## 2022-07-21 PROCEDURE — 85730 THROMBOPLASTIN TIME PARTIAL: CPT

## 2022-07-21 PROCEDURE — 99204 OFFICE O/P NEW MOD 45 MIN: CPT | Performed by: ANESTHESIOLOGY

## 2022-07-21 PROCEDURE — 36415 COLL VENOUS BLD VENIPUNCTURE: CPT

## 2022-07-21 PROCEDURE — 85025 COMPLETE CBC W/AUTO DIFF WBC: CPT

## 2022-07-21 PROCEDURE — 80053 COMPREHEN METABOLIC PANEL: CPT

## 2022-07-21 PROCEDURE — 99406 BEHAV CHNG SMOKING 3-10 MIN: CPT | Performed by: ANESTHESIOLOGY

## 2022-07-21 PROCEDURE — 82248 BILIRUBIN DIRECT: CPT

## 2022-07-21 RX ORDER — NICOTINE 21 MG/24HR
1 PATCH, TRANSDERMAL 24 HOURS TRANSDERMAL EVERY 24 HOURS
Qty: 28 PATCH | Refills: 0 | Status: SHIPPED | OUTPATIENT
Start: 2022-07-21 | End: 2022-10-21

## 2022-07-21 RX ORDER — NORTRIPTYLINE HYDROCHLORIDE 10 MG/1
10 CAPSULE ORAL NIGHTLY
Qty: 30 CAPSULE | Refills: 0 | Status: SHIPPED | OUTPATIENT
Start: 2022-07-21

## 2022-07-21 RX ORDER — MULTIVITAMIN WITH IRON
100 TABLET ORAL DAILY
Qty: 30 TABLET | Refills: 0 | Status: SHIPPED | OUTPATIENT
Start: 2022-07-21

## 2022-07-21 RX ORDER — ME-TETRAHYDROFOLATE/B12/HRB236 1-1-500 MG
1 CAPSULE ORAL DAILY
Qty: 90 CAPSULE | Refills: 1 | Status: SHIPPED | OUTPATIENT
Start: 2022-07-21

## 2022-07-21 RX ORDER — ST. JOHN'S WORT 300 MG
400 CAPSULE ORAL 3 TIMES DAILY
Qty: 180 CAPSULE | Refills: 5 | Status: SHIPPED | OUTPATIENT
Start: 2022-07-21

## 2022-07-25 ENCOUNTER — OUTSIDE FACILITY SERVICE (OUTPATIENT)
Dept: PAIN MEDICINE | Facility: CLINIC | Age: 72
End: 2022-07-25

## 2022-07-25 PROCEDURE — 64484 NJX AA&/STRD TFRM EPI L/S EA: CPT | Performed by: ANESTHESIOLOGY

## 2022-07-25 PROCEDURE — 99152 MOD SED SAME PHYS/QHP 5/>YRS: CPT | Performed by: ANESTHESIOLOGY

## 2022-07-25 PROCEDURE — 64483 NJX AA&/STRD TFRM EPI L/S 1: CPT | Performed by: ANESTHESIOLOGY

## 2022-07-26 ENCOUNTER — TELEPHONE (OUTPATIENT)
Dept: PAIN MEDICINE | Facility: CLINIC | Age: 72
End: 2022-07-26

## 2022-07-26 NOTE — TELEPHONE ENCOUNTER
Spoke with pts wife regarding yesterday’s procedure with Dr. Brewer and how they are feeling. Spoke with pts wife who advised that he's doing well, but that he can't tell a full difference as of yet Advised can can take up to a week for full effect of the injection. Advised of follow up appointment, and to call the office as needed. Advised to call Dr. Farris's office regarding the gabapentin.

## 2022-09-01 ENCOUNTER — TELEPHONE (OUTPATIENT)
Dept: NEUROSURGERY | Facility: CLINIC | Age: 72
End: 2022-09-01

## 2022-09-01 ENCOUNTER — TELEPHONE (OUTPATIENT)
Dept: PAIN MEDICINE | Facility: CLINIC | Age: 72
End: 2022-09-01

## 2022-09-01 DIAGNOSIS — M51.16 LUMBAR DISC HERNIATION WITH RADICULOPATHY: Primary | ICD-10-CM

## 2022-09-01 DIAGNOSIS — R53.81 PHYSICAL DECONDITIONING: ICD-10-CM

## 2022-09-01 DIAGNOSIS — M51.36 DDD (DEGENERATIVE DISC DISEASE), LUMBAR: ICD-10-CM

## 2022-09-01 DIAGNOSIS — M79.18 MYOFASCIAL PAIN: ICD-10-CM

## 2022-09-01 DIAGNOSIS — M47.816 LUMBAR FACET ARTHROPATHY: ICD-10-CM

## 2022-09-01 RX ORDER — TIZANIDINE HYDROCHLORIDE 4 MG/1
4 CAPSULE, GELATIN COATED ORAL 3 TIMES DAILY PRN
Qty: 30 CAPSULE | Refills: 1 | Status: SHIPPED | OUTPATIENT
Start: 2022-09-01

## 2022-09-01 RX ORDER — PREDNISONE 50 MG/1
50 TABLET ORAL DAILY
Qty: 10 TABLET | Refills: 0 | Status: SHIPPED | OUTPATIENT
Start: 2022-09-01 | End: 2022-10-21

## 2022-09-01 RX ORDER — GABAPENTIN 300 MG/1
300 CAPSULE ORAL 4 TIMES DAILY
Qty: 50 CAPSULE | Refills: 1 | Status: SHIPPED | OUTPATIENT
Start: 2022-09-01 | End: 2022-10-27 | Stop reason: SDUPTHER

## 2022-09-01 NOTE — TELEPHONE ENCOUNTER
PATIENTS WIFE CALLED TO SAY THAT  IS IN SEVERE PAIN THAT STARTED YESTERDAY AND HE IS  DRAGGING HIS LEG.  WIFE STATES THAT REGARDLESS OF WETHER PATIENT IS LAYING DOWN, SITTING OR STANDING THE PAIN IS PRESENT AND     NOTHING HELPS THE PAIN.PLEASE ADVISE.

## 2022-09-01 NOTE — TELEPHONE ENCOUNTER
Provider: REYMUNDO  Caller: ANUSHA FOSS  Relationship to Patient:   Pharmacy:   Phone Number: [phone]  Reason for Call: LOW BACK PAIN INTO LEFT LEG  When was the patient last seen: 6/2/2022  When did it start: 8/31/2022  Where is it located: LOW BACK, LEFT LEG, COULD NOT GET ANY RELIEF.  DRAGGING LEFT LEG  Characteristics of symptom/severity: 10/10  Timing- Is it constant or intermittent: CONSTENT  What makes it worse:   What makes it better: NOTHING  What therapies/medications have you tried: PRESCRIBED MED, WET HOT TOWELS    ANUSHA WILL CALL PAIN MANAGEMENT AS WELL FOR GUIDANCE    PLEASE CALL ANUSHA -[phone]

## 2022-09-01 NOTE — TELEPHONE ENCOUNTER
9/1/22    Lower back left side down the side of the leg to the top of the ankle. Started yesterday, he was up all night because of severe pain.    He is not taking Neurontin anymore.     I am calling in Neurontin, Zanaflex, steroids.    Joie Parker PA-C

## 2022-09-01 NOTE — TELEPHONE ENCOUNTER
Provider:    Surgery/Procedure:  FUTURE EMG 9/23/22   Surgery/Procedure Date:  --        Last visit:   Office Visit with Kasey Gonzalez PA-C (06/28/2022)    Next visit: ---     Reason for call:  Spoke to Lucrecia (wife)  When she got home from work yesterday he was in severe pain. Unsure if he pulled something or something is worsened. He couldn't sit or lay, is now 'dragging' his left leg. He did not sleep last night, finally fell asleep around 7:45a.     When did it start: Symptoms started yesterday. He had been out working and piddling around. But is unsure if he done anything to trigger the symptoms.     Where is it located: Low back, left leg.    How long has this been going on/is this new or the same as before surgery:  NA, he has had a previous ACDF in the past, unsure who the provider was.     Characteristics of symptom/severity: Dragging left foot. Low back down left leg. Patient was 10/10, in tears from pain. (wife was relaying what she seen. Patient is asleep after being up all night due to pain, wife wants to let him rest)     Timing- Is it constant or intermittent: pain is constant     What makes it worse: everything.     What makes it better: DAVID was helping momentarily however is increased symptoms are sudden and severe.     What therapies/medications have you tried:      Is taking the medications prescribed, med list is accurate. He is unable to do the water therapy due to length of travel.   His injections were helping but over the injections, symptoms returned.

## 2022-09-22 DIAGNOSIS — M51.36 DDD (DEGENERATIVE DISC DISEASE), LUMBAR: ICD-10-CM

## 2022-09-22 DIAGNOSIS — M47.816 LUMBAR FACET ARTHROPATHY: ICD-10-CM

## 2022-09-22 DIAGNOSIS — M51.16 LUMBAR DISC HERNIATION WITH RADICULOPATHY: Primary | ICD-10-CM

## 2022-09-22 DIAGNOSIS — M51.16 LUMBAR DISC HERNIATION WITH RADICULOPATHY: ICD-10-CM

## 2022-09-22 RX ORDER — TRAMADOL HYDROCHLORIDE 50 MG/1
50 TABLET ORAL EVERY 8 HOURS PRN
Qty: 40 TABLET | Refills: 1 | Status: SHIPPED | OUTPATIENT
Start: 2022-09-22 | End: 2022-09-23 | Stop reason: SDUPTHER

## 2022-09-22 NOTE — TELEPHONE ENCOUNTER
Provider:  Jarrett  Surgery/Procedure:  NONE  Surgery/Procedure Date:  NONE  Last visit:   Office Visit with Kasey Gonzalez PA-C (06/28/2022)    Next visit: TBD     Reason for call: Patients wife Lucrecia called and LVM on the clinical line stating she spoke with another provider a few weeks ago. (Please see encounter from 09/01/2022). Wife stated the back, and LLE pain is worse now than ever, and the medication prescribed during the last call is not helping.       I called and talked to the patients wife. Pain is much worse now than it has ever been. Patient can no longer sleep in the bed at night, he has to sleep on the couch with pillows propped up. Wife stated the patient is now unable to get any relief from the pain. Patient is currently taking Gabapentin 300mg QID.     Pain is in lower back, and down the left leg all the way to the ankle. Pain is described as a constant now.     EMG was ordered by Dr. Brewer, and this appointment is tomorrow. Patient has another appointment with PM on 09/27/2022.    Patient wife is wanting to know if anything else can be done to help the patient with his pain.

## 2022-09-23 ENCOUNTER — HOSPITAL ENCOUNTER (OUTPATIENT)
Dept: NEUROLOGY | Facility: HOSPITAL | Age: 72
Discharge: HOME | End: 2022-09-23
Admitting: ANESTHESIOLOGY

## 2022-09-23 DIAGNOSIS — M48.062 LUMBAR STENOSIS WITH NEUROGENIC CLAUDICATION: ICD-10-CM

## 2022-09-23 PROCEDURE — 95910 NRV CNDJ TEST 7-8 STUDIES: CPT

## 2022-09-23 PROCEDURE — 95886 MUSC TEST DONE W/N TEST COMP: CPT

## 2022-09-23 RX ORDER — TRAMADOL HYDROCHLORIDE 50 MG/1
50 TABLET ORAL EVERY 8 HOURS PRN
Qty: 40 TABLET | Refills: 1 | Status: SHIPPED | OUTPATIENT
Start: 2022-09-23 | End: 2022-09-23 | Stop reason: SDUPTHER

## 2022-09-23 RX ORDER — TRAMADOL HYDROCHLORIDE 50 MG/1
50 TABLET ORAL EVERY 8 HOURS PRN
Qty: 40 TABLET | Refills: 1 | Status: SHIPPED | OUTPATIENT
Start: 2022-09-23 | End: 2022-11-01 | Stop reason: SDUPTHER

## 2022-09-23 NOTE — TELEPHONE ENCOUNTER
[facility] wanting [facility] on the tramadol Rx. Pharmacy stated there are two different directions for the sig.     Pharmacy is requiring a new Rx be sent to them with only one set of directions. Medication Rx pended with only one set of directions.

## 2022-09-23 NOTE — TELEPHONE ENCOUNTER
I called and informed the wife. She needs the Tramadol sent to the [facility] in [place] ([address]). They are currently in [place], and will not be home until after Monday.     [facility] and cancelled with [facility] in [place], [place].

## 2022-09-26 NOTE — PROGRESS NOTES
"Chief Complaint: \"Left leg pain\"        History of Present Illness:   Patient: Mr. Edd Bautista, 71 y.o. male originally referred by Kasey Gonzalez PA-C in consultation for chronic intractable lower back and left lower extremity pain.  He has a longstanding history of neck and lower back issues.  He was seen by Dr. Mar many years ago for neck problems and had some injections with Dr. Paul.  He was more recently seen by Dr. Farias in May 2022 for increasing lower back and left lower extremity pain with intolerance to standing and walking.  MRI of the lumbar spine on May 5, 2022, revealed diffuse degenerative disc and facet arthritis.  At L5-S1 there is a far lateral left disc herniation which contributes severe neuroforaminal stenosis with compromise of the left L5 nerve root.  On July 25, 2022, he underwent diagnostic and therapeutic left L5-S1 and left S1 transforaminal epidural steroid injections, from which he reports this provided him with almost complete pain relief and functional improvement lasting three weeks, with progressive recurrence to previous levels.  Although, upon further conversation thereafter he continued with significant functional improvement lasting until about 1 week ago.  He did begin physical therapy thereafter.  He underwent electrodiagnostic studies of the bilateral lower extremities which demonstrated a chronic L5 radiculopathy on the left and a mild axonal peripheral neuropathy.  He returns today for postprocedure follow-up and evaluation.  Pain Description: Constant left lower back pain with intermittent exacerbation, described as aching, burning, sharp, and shooting sensation.   Radiation of Pain: The pain radiates into the left gluteal region, left posterior thigh, posterior calf, and plantar aspect of his left foot and toes.  The leg pain is more severe than the back pain.     Pain intensity today: 10/10  Average pain intensity last week: 10/10  Pain intensity ranges from: 10/10 " to 10/10  Aggravating factors: Pain increases with bending, twisting, extension of the lumbar spine, standing, walking. Patient describes neurogenic claudication. Patient does not use a cane or walker  Alleviating factors: Pain decreases with changing positions, lying down on his left side  Associated Symptoms:   Patient reports pain, numbness, and weakness in the left lower extremity. Numbness in the right foot (neuropathy)   Patient denies any new bladder or bowel problems.   Patient reports difficulties with his balance but denies recent falls.   Pain interferes with ADLs, general activities (ability to walk, stand, transition from different positions), and affects patient's quality of life  Pain interferes with sleep causing sleep fragmentation     Review of previous therapies and additional medical records:  Edd Bautista has already failed the following measures, including:   Conservative Measures: Oral analgesics, topical analgesics, ice, heat, exercise program by PT, physical therapy   Interventional Measures: None for his lower back. Last injection for neck pain > 10 years ago by Dr. Paul  07/25/2022: DxTx left L5-S1 and left S1 transforaminal epidural steroid injections  Surgical Measures: No history of previous cervical spine or shoulder surgery. No history of previous lumbar spine or hip surgery   Edd Bautista underwent neurosurgical consultation with Kasey Gonzalez PA-C on 06/28/2022, and was found not to be a surgical candidate.  Edd Bautista presents with significant comorbidities including a history of oral cancer, heavy smoker,  In terms of current analgesics, Edd Bautista takes: Gabapentin, ibuprofen  I have reviewed Vijay Report consistent with medication reconciliation.  SOAPP: High Risk       Global Pain Scale 07-19  2022 09-27 2022         Pain 18 25         Feelings 0 0         Clinical outcomes 20 20         Activities 20 25         GPS Total: 58 70           Review of New Diagnostic  Studies:  EMG/NCV of the bilateral lower extremities 9/23/2022: Chronic left L5 radiculopathy, mild axonal peripheral neuropathy    Review of Diagnostic Studies:  MRI of the lumbar spine 5/5/2022:   diffuse degenerative disc and facet joint disease.  There is trace retrolisthesis of L3 on L4 with foraminal and slightly extraforaminal disc bulging/osteophyte on the left at L3-4.  Mild bilateral lateral recess stenosis at L4-L5.  At L5-S1, there is a possible extraforaminal disc herniation on the left side that could compromise the left L5 nerve root.  There is also bilateral foraminal stenosis at L5-S1, greater on the left.  Lumbar spine x-rays and flexion-extension views 5/25/2022:  no evidence of instability during flexion or extension.  Moderate degenerative disc and facet joint disease in the lower lumbar region.    EMG/NCV by Dr. Woody Hall on 8/7/2015 revealed left ulnar neuropathy at the elbow       Review of Systems   Constitutional: Positive for activity change, appetite change and chills.   HENT: Positive for dental problem.    Cardiovascular: Positive for leg swelling.   Musculoskeletal: Positive for back pain and myalgias.   All other systems reviewed and are negative.        Patient Active Problem List   Diagnosis   • DDD (degenerative disc disease), lumbar   • Lumbar disc herniation with radiculopathy   • Lumbar stenosis with neurogenic claudication   • Encounter for smoking cessation counseling   • Physical deconditioning   • Gait disturbance   • Current every day smoker   • Myofascial pain   • Lumbar facet arthropathy   • Hx of transient thrombocytopenia   • Back problem       Past Medical History:   Diagnosis Date   • Acquired thrombocytopenia (HCC)    • Back problem    • Black lung (HCC)    • Cancer (HCC)     oral, now in remission   • Dysfunction of left eustachian tube    • Hernia of abdominal wall    • HL (hearing loss)    • Inguinal hernia    • Malignant tumor of oral cavity (HCC)    •  Perforation of left tympanic membrane    • Perforation of nasal septum    • Tinnitus of both ears          Past Surgical History:   Procedure Laterality Date   • ABDOMINAL HERNIA REPAIR  2021   • INNER EAR SURGERY Left 07/15/2019    LEFT MICROSCOPIC TYMPANOPLASTY, POSTAURICULAR HARVEST OF TEMPORALIS FASCIA GRAFT Dr. Lui   • SKIN CANCER EXCISION  2021    Skin and mouth cancer removed   • THROAT SURGERY Left 09/28/2011    Excision of left floor of mouth/ventral tongue [place]'s duct sphincter Dr Woody Lui         Family History   Problem Relation Age of Onset   • Brain cancer Mother          Social History     Socioeconomic History   • Marital status:    Tobacco Use   • Smoking status: Current Every Day Smoker     Packs/day: 1.00     Years: 35.00     Pack years: 35.00   • Smokeless tobacco: Never Used   Vaping Use   • Vaping Use: Never used   Substance and Sexual Activity   • Alcohol use: Yes     Alcohol/week: 3.0 standard drinks     Types: 3 Cans of beer per week   • Drug use: Never   • Sexual activity: Defer           Current Outpatient Medications:   •  Alpha Lipoic Acid 200 MG capsule, Take 400 mg by mouth 3 (Three) Times a Day., Disp: 180 capsule, Rfl: 5  •  gabapentin (NEURONTIN) 300 MG capsule, Take 1 capsule by mouth 4 (Four) Times a Day., Disp: 50 capsule, Rfl: 1  •  ibuprofen (ADVIL,MOTRIN) 800 MG tablet, Take 800 mg by mouth 3 (Three) Times a Day As Needed., Disp: , Rfl:   •  TiZANidine (Zanaflex) 4 MG capsule, Take 1 capsule by mouth 3 (Three) Times a Day As Needed for Muscle Spasms., Disp: 30 capsule, Rfl: 1  •  traMADol (ULTRAM) 50 MG tablet, Take 1 tablet by mouth Every 8 (Eight) Hours As Needed for Moderate Pain or Severe Pain., Disp: 40 tablet, Rfl: 1  •  triamcinolone (KENALOG) 0.1 % paste, , Disp: , Rfl:   •  vitamin B-6 (PYRIDOXINE) 100 MG tablet, Take 1 tablet by mouth Daily., Disp: 30 tablet, Rfl: 0  •  Dietary Management Product (Rheumate) capsule, Take 1 capsule by mouth  "Daily., Disp: 90 capsule, Rfl: 1  •  gabapentin (NEURONTIN) 300 MG capsule, Take 1 capsule by mouth 3 (Three) Times a Day., Disp: 45 capsule, Rfl: 0  •  Gel Base gel, 2 g 4 (Four) Times a Day. prilocaine 2%, lidocaine 10%, imipramine 3%, capsaicin 0.001% and mannitol 20%, Disp: 240 g, Rfl: 5  •  nicotine (NICODERM CQ) 14 MG/24HR patch, Place 1 patch on the skin as directed by provider Daily., Disp: 28 patch, Rfl: 0  •  nicotine (NICODERM CQ) 21 MG/24HR patch, Place 1 patch on the skin as directed by provider Daily., Disp: 28 patch, Rfl: 0  •  nicotine (NICODERM CQ) 7 MG/24HR patch, Place 1 patch on the skin as directed by provider Daily., Disp: 28 patch, Rfl: 0  •  nortriptyline (PAMELOR) 10 MG capsule, Take 1 capsule by mouth Every Night., Disp: 30 capsule, Rfl: 0  •  pancrelipase, Lip-Prot-Amyl, (Creon) 73141-55669 units capsule delayed-release particles capsule, Creon 24,000-76,000-120,000 unit capsule,delayed release  TAKE ONE CAPSULE BY MOUTH WITH SNACKS AND TAKE TWO CAPSULES BY MOUTH WITH MEALS, Disp: , Rfl:   •  predniSONE (DELTASONE) 50 MG tablet, Take 1 tablet by mouth Daily., Disp: 10 tablet, Rfl: 0      No Known Allergies      /78 (BP Location: Left arm, Patient Position: Sitting, Cuff Size: Adult)   Pulse 89   Temp 97.6 °F (36.4 °C) (Infrared)   Resp 18   Ht 175.3 cm (69\")   Wt 54.2 kg (119 lb 6.4 oz)   SpO2 100%   BMI 17.63 kg/m²       Physical Exam:  Constitutional: Patient appears well-developed, well-nourished, well-hydrated  HEENT: Head: Normocephalic and atraumatic  Eyes: Conjunctivae and lids are normal  Pupils: Equal, round, reactive to light  Peripheral vascular exam: Femoral: right 2+, left 2+. Posterior tibialis: right 2+ and left 2+. Dorsalis pedis: right 2+ and left 2+. No edema.   Musculoskeletal   Gait and station: Gait evaluation demonstrated shuffling.    Lumbar spine: Passive and active range of motion are limited secondary to pain. Extension, flexion, lateral flexion, " rotation of the lumbar spine increased and reproduced pain. Lumbar facet joint loading maneuvers are positive.  Julio test, Gaenslen's test, thigh thrust test, SI compression test, Yeoman's test are negative   Piriformis maneuvers are negative   Hip joints: The range of motion of the hip joints is almost full and without pain, some limitations on the left secondary to leg pain.   Palpation of the bilateral greater trochanter, unrevealing   Examination of the Iliotibial band: unrevealing   Neurological:   Patient is alert and oriented to person, place, and time.   Speech: Normal.   Cortical function: Normal mental status.   Reflex Scores:  Right brachioradialis: 2+  Left brachioradialis: 2+  Right biceps: 2+  Left biceps: 2+  Right triceps: 2+  Left triceps: 2+  Right patellar: 0+  Left patellar: 0+  Right Achilles: 0+  Left Achilles: 0+  Motor strength: 5/5  Motor Tone: Normal  Involuntary movements: None.   Superficial/Primitive Reflexes: Primitive reflexes were absent.   Right Lovett: Absent  Left Lovett: Absent  Right ankle clonus: Absent  Left ankle clonus: Absent   Babinsky: Absent  Long tract signs: Negative. Straight leg raising test: Negative on the right. Positive on the left at 30 degrees. Femoral stretch sign: Negative.   Sensory exam: Intact to light touch, intact pain and temperature sensation, intact vibration sensation and normal proprioception.   Coordination: Normal finger to nose and heel to shin. Normal balance and negative Romberg's sign   Skin and subcutaneous tissue: Skin is warm and intact. No rash noted. No cyanosis.   Psychiatric: Judgment and insight: Normal. Recent and remote memory: Intact. Mood and affect: Normal.     ASSESSMENT:   1. Lumbar stenosis with neurogenic claudication    2. Lumbar radiculopathy    3. Lumbar disc herniation with radiculopathy    4. Spondylosis of lumbar region without myelopathy or radiculopathy    5. Myofascial pain    6. Physical deconditioning    7.  Current every day smoker          PLAN/MEDICAL DECISION MAKING:  Mr. Edd Bautista, 71 y.o. male has a longstanding history of neck and lower back issues.  He was seen by Dr. Mar many years ago for neck problems and had some injections with Dr. Paul.  He was more recently seen by Dr. Farias in May 2022 for increasing lower back and left lower extremity pain with intolerance to standing and walking.  presents with a longstanding history of chronic lower back pain refractory to conservative measures. MRI of the lumbar spine on 5/5/2022 revealed diffuse moderate degenerative disc and facet joint disease in the lower lumbar region.  There is trace of facet of L3 on L4 with foraminal and slightly extraforaminal disc bulging/osteophyte on the left at L3-L4.  Mild bilateral lateral recess stenosis at L4-L5.  At L5-S1, there is a a far lateral left for disc herniation contributing to severe neuroforaminal stenosis with compromise the left L5 nerve root. Bilateral foraminal stenosis at L5-S1, greater on the left. Flexion-extension x-rays on 5/25/2022 revealed no evidence of instability in flexion or extension.  Electrodiagnostic studies demonstrated chronic left L5 radiculopathy, which correlates with his symptoms.  Patient has failed to obtain pain relief with conservative measures for more than 3 months including oral analgesics, physical therapy for several weeks without relief, to name a few. Edd Bautista underwent neurosurgical consultation with Kasey Gonzalez PA-C on 06/28/2022, and was found not to be a surgical candidate.  If pain persists, they have discussed the possibility of obtaining a myelogram for surgical delineation.  I have reviewed all available patient's medical records as well as previous therapies as referenced above. I had a lengthy conversation with Mr. Edd Bautista regarding his chronic pain condition and potential therapeutic options including risks, benefits, alternative therapies, to name a  few. Therefore, I have proposed the following plan:  1. Interventional pain management measures: Patient will be scheduled for left L5-S1 and left S1 transforaminal epidural steroid injections.  If relief is not sustained, I have recommended he follow-up with Dr. Farias. He also has extensive facet hypertrophy at all levels but mainly at L3-L4 and L4-L5 with chronic axial mechanical lower back pain.  Although currently his lower back pain is minimal compared to his left leg symptoms.  If his leg symptoms were to improve we could possibly diagnostic bilateral lumbar medial branch blocks at L2, L3, L4; for bilateral lumbar facet joints at L3-L4, L4-L5, to clarify the origin of chronic refractory mechanical lower back pain. If patient experiences more than 80% relief along with significant improvement the range of motion of the lumbar spine, then, patient will be scheduled for a second set of diagnostic bilateral lumbar medial branch blocks, to then, proceed with bilateral lumbar medial branch rhizotomies. He could also be a candidate for a spinal cord stimulator trial if he is found not to be a surgical candidate  2. Pharmacological measures: Reviewed and discussed;   A. Patient takes gabapentin, ibuprofen  B. Continue Rheumate one tablet daily  C. Continue alpha lipoid acid 0538-4079 mg per day divided into 3 doses  D. Continue prilocaine 2%, lidocaine 10%, imipramine 3%, capsaicin 0.001% and mannitol 20% cream, apply 1 to 2 grams of cream to the affected areas every 4 to 6 hours as needed  E. Continue nortriptyline 10 mg 1 tablet at bedtime  3. Long-term rehabilitation efforts:  A. The patient does not have a history of falls but has risk factors for falls . I did complete a risk assessment for falls. Fall precautions: Patient has been instructed regarding universal fall precautions, such as;   • Using gait aids a cane or a rolling walker at the appropriate height at all times for ambulation   • Removing all area  rugs and coffee tables to create a safe environment at home  • Ensure clean, dry floors  • Wearing supportive footwear and properly fitting clothing  • Ensure bed/chair is appropriate height and patient's feet can touch the floor  • Using a shower transfer bench  • Using walk-in shower and having shower safety bars installed  • Ensure proper lighting, minimize glare  • Have nightlights operational and in use  • Participation in an exercise program for gait training, balance training and strength  • Avoid carrying laundry up and down steps  • Ensure proper compliance and organization of medications to avoid errors   • Avoid use of over the counter sedatives and alcohol consumption  • Ensure easy access to call bell, glasses, TV control, telephone  • Ensure glasses/hearing aids are in use or close by (on top of night table)  B.  Patient will start a comprehensive physical therapy program for reconditioning, water therapy, Alter-G, core strengthening, gait and balance training, neurodynamics, E-STIM, myofascial release, cupping, dry needling, home exercises  C.  Start an exercise program such as yoga, water therapy and swimming  D. Continue use of TENS unit/interferential unit  E. Contrast therapy: Apply ice-packs for 15-20 minutes, followed by heating pads for 15-20 minutes to affected area   F. Edd Bautista  reports that he has been smoking. He has a 35.00 pack-year smoking history. He has never used smokeless tobacco. I have educated him on the risk of diseases from using tobacco products such as cancer, COPD and heart disease. I advised him to quit and he is willing to quit. I have instructed him to begin nicotine patches.  4. The patient has been instructed to contact my office with any questions or difficulties. The patient understands the plan and agrees to proceed accordingly.       Edd Bautista reports a pain score of 10.  Given his pain assessment as noted, treatment options were discussed and the following  options were decided upon as a follow-up plan to address the patient's pain: continuation of current treatment plan for pain, home exercises and therapy, referral to Physical Therapy, steroid injections and use of non-medical modalities (ice, heat, stretching and/or behavior modifications).         Pain Medications             gabapentin (NEURONTIN) 300 MG capsule Take 1 capsule by mouth 4 (Four) Times a Day.    ibuprofen (ADVIL,MOTRIN) 800 MG tablet Take 800 mg by mouth 3 (Three) Times a Day As Needed.    TiZANidine (Zanaflex) 4 MG capsule Take 1 capsule by mouth 3 (Three) Times a Day As Needed for Muscle Spasms.    traMADol (ULTRAM) 50 MG tablet Take 1 tablet by mouth Every 8 (Eight) Hours As Needed for Moderate Pain or Severe Pain.    gabapentin (NEURONTIN) 300 MG capsule Take 1 capsule by mouth 3 (Three) Times a Day.    nortriptyline (PAMELOR) 10 MG capsule Take 1 capsule by mouth Every Night.    predniSONE (DELTASONE) 50 MG tablet Take 1 tablet by mouth Daily.         Please note that portions of this note were completed with a voice recognition program.     TOYIN Michel    Patient Care Team:  Mukund Aguillon MD as PCP - General  Mukund Aguillon MD as PCP - Family Medicine  Pawel Brewer MD as Consulting Physician (Pain Medicine)     No orders of the defined types were placed in this encounter.        Future Appointments   Date Time Provider Department Center   9/27/2022  2:30 PM Tamra Early APRN [facility]

## 2022-09-27 ENCOUNTER — OFFICE VISIT (OUTPATIENT)
Dept: PAIN MEDICINE | Facility: CLINIC | Age: 72
End: 2022-09-27

## 2022-09-27 VITALS
RESPIRATION RATE: 18 BRPM | OXYGEN SATURATION: 100 % | DIASTOLIC BLOOD PRESSURE: 78 MMHG | TEMPERATURE: 97.6 F | BODY MASS INDEX: 17.68 KG/M2 | WEIGHT: 119.4 LBS | HEART RATE: 89 BPM | SYSTOLIC BLOOD PRESSURE: 148 MMHG | HEIGHT: 69 IN

## 2022-09-27 DIAGNOSIS — M48.062 LUMBAR STENOSIS WITH NEUROGENIC CLAUDICATION: Primary | ICD-10-CM

## 2022-09-27 DIAGNOSIS — M54.16 LUMBAR RADICULOPATHY: ICD-10-CM

## 2022-09-27 DIAGNOSIS — F17.200 CURRENT EVERY DAY SMOKER: ICD-10-CM

## 2022-09-27 DIAGNOSIS — M48.062 LUMBAR STENOSIS WITH NEUROGENIC CLAUDICATION: ICD-10-CM

## 2022-09-27 DIAGNOSIS — M51.16 LUMBAR DISC HERNIATION WITH RADICULOPATHY: ICD-10-CM

## 2022-09-27 DIAGNOSIS — M79.18 MYOFASCIAL PAIN: ICD-10-CM

## 2022-09-27 DIAGNOSIS — M47.816 SPONDYLOSIS OF LUMBAR REGION WITHOUT MYELOPATHY OR RADICULOPATHY: ICD-10-CM

## 2022-09-27 DIAGNOSIS — R53.81 PHYSICAL DECONDITIONING: ICD-10-CM

## 2022-09-27 PROCEDURE — 99213 OFFICE O/P EST LOW 20 MIN: CPT | Performed by: NURSE PRACTITIONER

## 2022-09-27 RX ORDER — TRIAMCINOLONE ACETONIDE 0.1 %
PASTE (GRAM) DENTAL
COMMUNITY
Start: 2022-09-22

## 2022-09-28 ENCOUNTER — OUTSIDE FACILITY SERVICE (OUTPATIENT)
Dept: PAIN MEDICINE | Facility: CLINIC | Age: 72
End: 2022-09-28

## 2022-09-28 PROCEDURE — 99152 MOD SED SAME PHYS/QHP 5/>YRS: CPT | Performed by: ANESTHESIOLOGY

## 2022-09-28 PROCEDURE — 64483 NJX AA&/STRD TFRM EPI L/S 1: CPT | Performed by: ANESTHESIOLOGY

## 2022-09-28 PROCEDURE — 64484 NJX AA&/STRD TFRM EPI L/S EA: CPT | Performed by: ANESTHESIOLOGY

## 2022-09-28 RX ORDER — ONDANSETRON 4 MG/1
4 TABLET, FILM COATED ORAL EVERY 8 HOURS PRN
Qty: 20 TABLET | Refills: 0 | Status: SHIPPED | OUTPATIENT
Start: 2022-09-28

## 2022-09-29 ENCOUNTER — TELEPHONE (OUTPATIENT)
Dept: PAIN MEDICINE | Facility: CLINIC | Age: 72
End: 2022-09-29

## 2022-09-29 NOTE — TELEPHONE ENCOUNTER
"FOLLOW-UP CALL AFTER PROCEDURE  I spoke with pts wife, Lucrecia  regarding how patient is feeling after yesterday's procedure with Dr. Brewer. Lucrecia reports that he \"isn't doing well today, and that he is hurting today\".   Edd Bautista underwent a diagnostic procedure (see procedure note for details) on 9/28/2022. Patient was examined in the recovery room after the procedure by Dr. Brewer (see procedure note for details). Patient reported 100% pain relief. In addition, patient experienced significant functional improvement (performing activities without experiencing pain in comparison with examination prior to the procedure that reproduced pain with those activities).   Pain level before procedure: 10/10   Pain level after procedure: 0/10  Patient reports that the pain relief lasted for couple of hours. Today, Edd Bautista reports 0% ongoing pain relief pain (pain level 7/10) and pain has returned to baseline of sharp pain down pts hip.    Patient denies side effects or complications  Patient does not have any questions or concerns at this time  Disposition:   I provided information regarding date and time of next follow up with TOYIN Valdez.   "

## 2022-10-18 ENCOUNTER — TELEPHONE (OUTPATIENT)
Dept: NEUROSURGERY | Facility: CLINIC | Age: 72
End: 2022-10-18

## 2022-10-18 NOTE — TELEPHONE ENCOUNTER
Provider:  Dr. Farias  Surgery/Procedure:  DAVID- Vascello  Surgery/Procedure Date:    Last visit:   6/28/22  Next visit: NA     Reason for call:  PATIENT HAS HAD TWO INJECTIONS BUT ARE NOT WORKING AFTER 3 WEEKS EACH TIME, PATIENT STATES THAT HE HAS TO SIT UP TO SLEEP  THE SPOUSE IS CALLING FOR A FOLLOW UP     PATIENT WILL BE IN [place]OWN THIS Thursday AND Friday AND IS WONDERING IF HE CAN POSSIBLY BE SEEN FOR FURTHER HELP

## 2022-10-18 NOTE — TELEPHONE ENCOUNTER
Caller: Lucrecia Bautista    Relationship to patient: Emergency Contact    Best call back number: [phone]     Patient is needing:   PATIENT HAS HAD TWO INJECTIONS BUT ARE NOT WORKING AFTER 3 WEEKS EACH TIME, PATIENT STATES THAT HE HAS TO SIT UP TO SLEEP  THE SPOUSE IS CALLING FOR A FOLLOW UP    PATIENT WILL BE IN TOWN THIS Thursday AND Friday AND IS WONDERING IF HE CAN POSSIBLY BE SEEN FOR FURTHER HELP    PLEASE CALL TO ADVISE AND SCHEDULE

## 2022-10-20 ENCOUNTER — TRANSCRIBE ORDERS (OUTPATIENT)
Dept: CARDIOLOGY | Facility: HOSPITAL | Age: 72
End: 2022-10-20

## 2022-10-20 ENCOUNTER — HOSPITAL ENCOUNTER (OUTPATIENT)
Dept: CARDIOLOGY | Facility: HOSPITAL | Age: 72
Discharge: HOME | End: 2022-10-20

## 2022-10-20 ENCOUNTER — LAB (OUTPATIENT)
Dept: LAB | Facility: HOSPITAL | Age: 72
End: 2022-10-20

## 2022-10-20 ENCOUNTER — TRANSCRIBE ORDERS (OUTPATIENT)
Dept: LAB | Facility: HOSPITAL | Age: 72
End: 2022-10-20

## 2022-10-20 DIAGNOSIS — Z01.818 OTHER SPECIFIED PRE-OPERATIVE EXAMINATION: Primary | ICD-10-CM

## 2022-10-20 DIAGNOSIS — Z01.811 PRE-OP CHEST EXAM: Primary | ICD-10-CM

## 2022-10-20 DIAGNOSIS — Z01.811 PRE-OP CHEST EXAM: ICD-10-CM

## 2022-10-20 LAB
ALBUMIN SERPL-MCNC: 4.2 G/DL (ref 3.5–5.2)
ALBUMIN/GLOB SERPL: 1.6 G/DL
ALP SERPL-CCNC: 52 U/L (ref 39–117)
ALT SERPL W P-5'-P-CCNC: 18 U/L (ref 1–41)
ANION GAP SERPL CALCULATED.3IONS-SCNC: 8 MMOL/L (ref 5–15)
AST SERPL-CCNC: 23 U/L (ref 1–40)
BILIRUB SERPL-MCNC: 0.5 MG/DL (ref 0–1.2)
BUN SERPL-MCNC: 10 MG/DL (ref 8–23)
BUN/CREAT SERPL: 10.8 (ref 7–25)
CALCIUM SPEC-SCNC: 9.5 MG/DL (ref 8.6–10.5)
CHLORIDE SERPL-SCNC: 102 MMOL/L (ref 98–107)
CO2 SERPL-SCNC: 28 MMOL/L (ref 22–29)
CREAT SERPL-MCNC: 0.93 MG/DL (ref 0.76–1.27)
DEPRECATED RDW RBC AUTO: 42.6 FL (ref 37–54)
EGFRCR SERPLBLD CKD-EPI 2021: 88.9 ML/MIN/1.73
ERYTHROCYTE [DISTWIDTH] IN BLOOD BY AUTOMATED COUNT: 12.1 % (ref 12.3–15.4)
GLOBULIN UR ELPH-MCNC: 2.6 GM/DL
GLUCOSE SERPL-MCNC: 103 MG/DL (ref 65–99)
HCT VFR BLD AUTO: 38.8 % (ref 37.5–51)
HGB BLD-MCNC: 13.8 G/DL (ref 13–17.7)
MCH RBC QN AUTO: 34.2 PG (ref 26.6–33)
MCHC RBC AUTO-ENTMCNC: 35.6 G/DL (ref 31.5–35.7)
MCV RBC AUTO: 96.3 FL (ref 79–97)
PLATELET # BLD AUTO: 184 10*3/MM3 (ref 140–450)
PMV BLD AUTO: 9.4 FL (ref 6–12)
POTASSIUM SERPL-SCNC: 4.3 MMOL/L (ref 3.5–5.2)
PROT SERPL-MCNC: 6.8 G/DL (ref 6–8.5)
QT INTERVAL: 376 MS
QTC INTERVAL: 391 MS
RBC # BLD AUTO: 4.03 10*6/MM3 (ref 4.14–5.8)
SODIUM SERPL-SCNC: 138 MMOL/L (ref 136–145)
WBC NRBC COR # BLD: 6.14 10*3/MM3 (ref 3.4–10.8)

## 2022-10-20 PROCEDURE — 80053 COMPREHEN METABOLIC PANEL: CPT | Performed by: OTOLARYNGOLOGY

## 2022-10-20 PROCEDURE — 85027 COMPLETE CBC AUTOMATED: CPT | Performed by: OTOLARYNGOLOGY

## 2022-10-20 PROCEDURE — 93010 ELECTROCARDIOGRAM REPORT: CPT | Performed by: INTERNAL MEDICINE

## 2022-10-20 PROCEDURE — 93005 ELECTROCARDIOGRAM TRACING: CPT | Performed by: OTOLARYNGOLOGY

## 2022-10-20 PROCEDURE — 36415 COLL VENOUS BLD VENIPUNCTURE: CPT | Performed by: OTOLARYNGOLOGY

## 2022-10-21 ENCOUNTER — OFFICE VISIT (OUTPATIENT)
Dept: NEUROSURGERY | Facility: CLINIC | Age: 72
End: 2022-10-21

## 2022-10-21 VITALS — BODY MASS INDEX: 17.71 KG/M2 | TEMPERATURE: 97.8 F | WEIGHT: 119.6 LBS | HEIGHT: 69 IN

## 2022-10-21 DIAGNOSIS — M51.36 DDD (DEGENERATIVE DISC DISEASE), LUMBAR: Primary | ICD-10-CM

## 2022-10-21 DIAGNOSIS — M47.816 LUMBAR FACET ARTHROPATHY: ICD-10-CM

## 2022-10-21 DIAGNOSIS — M54.16 LEFT LUMBAR RADICULOPATHY: ICD-10-CM

## 2022-10-21 PROCEDURE — 99214 OFFICE O/P EST MOD 30 MIN: CPT | Performed by: PHYSICIAN ASSISTANT

## 2022-10-21 RX ORDER — TIZANIDINE 4 MG/1
4 TABLET ORAL DAILY
COMMUNITY

## 2022-10-21 NOTE — PROGRESS NOTES
NAME: SHANA FOSS   DOS: 10/21/2022  : 1950  PCP: Mukund Aguillon MD    Chief Complaint:  Lower back and left leg pain (Patient states he has had 3 injections, last DAVID 3 weeks ago. It has not helped with symptoms. )      History of Present Illness: Mr. Foss is a 71 y.o. male who is seen today in follow-up with low back pain and left leg radiculopathy.  Patient is a retired  who saw Dr. Mar years ago for neck problems.  Patient was initially seen in May by Dr. Farias for increasing back pain with left leg pain that radiates down the posterior/lateral aspect into his calf and foot, with noted exacerbation with walking or lying down.  MRI noted left L5-S1 far lateral disc herniation but it was deemed appropriate to attempt conservative management.  Patient underwent 3 weeks of physical therapy without any avail of symptomatology.  He has now undergone 2 bouts of injections with with only a few weeks of relief following the injections.  He remains on gabapentin, tramadol, Zanaflex, and ibuprofen.  Denies any bowel/bladder incontinence, perineal anesthesia, or overt weakness.  Patient is seen today in follow-up.      Past Medical History:   Diagnosis Date   • Acquired thrombocytopenia (HCC)    • Back problem    • Black lung (HCC)    • Cancer (HCC)     oral, now in remission   • Dysfunction of left eustachian tube    • Hernia of abdominal wall    • HL (hearing loss)    • Inguinal hernia    • Malignant tumor of oral cavity (HCC)    • Perforation of left tympanic membrane    • Perforation of nasal septum    • Tinnitus of both ears        Past Surgical History:   Procedure Laterality Date   • ABDOMINAL HERNIA REPAIR     • INNER EAR SURGERY Left 07/15/2019    LEFT MICROSCOPIC TYMPANOPLASTY, POSTAURICULAR HARVEST OF TEMPORALIS FASCIA GRAFT Dr. Lui   • SKIN CANCER EXCISION      Skin and mouth cancer removed   • THROAT SURGERY Left 2011    Excision of left floor of mouth/ventral tongue  Bibi's duct sphincter Dr Woody Lui             Review of Systems   Constitutional: Negative for activity change, appetite change, chills, diaphoresis, fatigue, fever and unexpected weight change.   HENT: Negative for congestion, dental problem, drooling, ear discharge, ear pain, facial swelling, hearing loss, mouth sores, nosebleeds, postnasal drip, rhinorrhea, sinus pressure, sinus pain, sneezing, sore throat, tinnitus, trouble swallowing and voice change.    Eyes: Negative for photophobia, pain, discharge, redness, itching and visual disturbance.   Respiratory: Negative for apnea, cough, choking, chest tightness, shortness of breath, wheezing and stridor.    Cardiovascular: Negative for chest pain, palpitations and leg swelling.   Gastrointestinal: Negative for abdominal distention, abdominal pain, anal bleeding, blood in stool, constipation, diarrhea, nausea, rectal pain and vomiting.   Endocrine: Negative for cold intolerance, heat intolerance, polydipsia, polyphagia and polyuria.   Genitourinary: Negative for decreased urine volume, difficulty urinating, dysuria, enuresis, flank pain, frequency, genital sores, hematuria and urgency.   Musculoskeletal: Positive for back pain. Negative for arthralgias, gait problem, joint swelling, myalgias, neck pain and neck stiffness.   Skin: Negative for color change, pallor, rash and wound.   Allergic/Immunologic: Negative for environmental allergies, food allergies and immunocompromised state.   Neurological: Negative for dizziness, tremors, seizures, syncope, facial asymmetry, speech difficulty, weakness, light-headedness, numbness and headaches.   Hematological: Negative for adenopathy. Does not bruise/bleed easily.   Psychiatric/Behavioral: Negative for agitation, behavioral problems, confusion, decreased concentration, dysphoric mood, hallucinations, self-injury, sleep disturbance and suicidal ideas. The patient is not nervous/anxious and is not hyperactive.     All other systems reviewed and are negative.           Medications:    Current Outpatient Medications:   •  Alpha Lipoic Acid 200 MG capsule, Take 400 mg by mouth 3 (Three) Times a Day., Disp: 180 capsule, Rfl: 5  •  Dietary Management Product (Rheumate) capsule, Take 1 capsule by mouth Daily., Disp: 90 capsule, Rfl: 1  •  gabapentin (NEURONTIN) 300 MG capsule, Take 1 capsule by mouth 4 (Four) Times a Day., Disp: 50 capsule, Rfl: 1  •  Gel Base gel, 2 g 4 (Four) Times a Day. prilocaine 2%, lidocaine 10%, imipramine 3%, capsaicin 0.001% and mannitol 20%, Disp: 240 g, Rfl: 5  •  ibuprofen (ADVIL,MOTRIN) 800 MG tablet, Take 800 mg by mouth 3 (Three) Times a Day As Needed., Disp: , Rfl:   •  nortriptyline (PAMELOR) 10 MG capsule, Take 1 capsule by mouth Every Night., Disp: 30 capsule, Rfl: 0  •  ondansetron (Zofran) 4 MG tablet, Take 1 tablet by mouth Every 8 (Eight) Hours As Needed for Nausea or Vomiting., Disp: 20 tablet, Rfl: 0  •  pancrelipase, Lip-Prot-Amyl, (Creon) 62137-24778 units capsule delayed-release particles capsule, Creon 24,000-76,000-120,000 unit capsule,delayed release  TAKE ONE CAPSULE BY MOUTH WITH SNACKS AND TAKE TWO CAPSULES BY MOUTH WITH MEALS, Disp: , Rfl:   •  TiZANidine (Zanaflex) 4 MG capsule, Take 1 capsule by mouth 3 (Three) Times a Day As Needed for Muscle Spasms., Disp: 30 capsule, Rfl: 1  •  tiZANidine (ZANAFLEX) 4 MG tablet, Take 1 tablet by mouth Daily., Disp: , Rfl:   •  traMADol (ULTRAM) 50 MG tablet, Take 1 tablet by mouth Every 8 (Eight) Hours As Needed for Moderate Pain or Severe Pain., Disp: 40 tablet, Rfl: 1  •  triamcinolone (KENALOG) 0.1 % paste, , Disp: , Rfl:   •  vitamin B-6 (PYRIDOXINE) 100 MG tablet, Take 1 tablet by mouth Daily., Disp: 30 tablet, Rfl: 0    Allergies:  No Known Allergies    Social History     Tobacco Use   • Smoking status: Every Day     Packs/day: 1.00     Years: 35.00     Pack years: 35.00     Types: Cigarettes   • Smokeless tobacco: Never   Vaping Use    • Vaping Use: Never used   Substance Use Topics   • Alcohol use: Yes     Alcohol/week: 3.0 standard drinks     Types: 3 Cans of beer per week   • Drug use: Never       Family History   Problem Relation Age of Onset   • Brain cancer Mother        Review of Imaging:  MRI of the lumbar spine that was performed on  reveals left L5-S1 far lateral disc herniation.        Vitals:    10/21/22 1329   Temp: 97.8 °F (36.6 °C)     Body mass index is 17.65 kg/m².    Physical Exam  Neurological:      Mental Status: He is oriented to person, place, and time.       Neurologic Exam     Mental Status   Oriented to person, place, and time.     Motor Exam     Strength   Right iliopsoas: 5/5  Left iliopsoas: 5/5  Right quadriceps: 5/5  Left quadriceps: 5/5  Right hamstrin/5  Left hamstrin/5  Right anterior tibial: 5/5  Left anterior tibial: 5/5  Right peroneal: 5/5  Left peroneal: 5/5Positive straight leg raise on the left     Gait, Coordination, and Reflexes     Gait  Gait: (Antalgic with limp)    Reflexes   Right Lovett: absent  Left Lovett: absent      Diagnoses/Plan:    Mr. Bautista is a 71 y.o. male who is seen today with low back pain with left leg radiculopathy.  Patient has failed all attempts of conservative management.  Therefore, will have patient undergo a lumbar myelogram for further dynamic assessment.  Signs and symptoms were reviewed with patient that would warrant a sooner call to our office.  Patient notes understanding and willing to proceed.            Susanna Schuster PA-C

## 2022-10-24 ENCOUNTER — TRANSCRIBE ORDERS (OUTPATIENT)
Dept: ADMINISTRATIVE | Facility: HOSPITAL | Age: 72
End: 2022-10-24

## 2022-10-24 DIAGNOSIS — M51.16 LUMBAR DISC HERNIATION WITH RADICULOPATHY: Primary | ICD-10-CM

## 2022-10-25 ENCOUNTER — HOSPITAL ENCOUNTER (OUTPATIENT)
Dept: GENERAL RADIOLOGY | Facility: HOSPITAL | Age: 72
Discharge: HOME | End: 2022-10-25

## 2022-10-25 ENCOUNTER — HOSPITAL ENCOUNTER (OUTPATIENT)
Dept: CT IMAGING | Facility: HOSPITAL | Age: 72
Discharge: HOME | End: 2022-10-25

## 2022-10-25 VITALS
DIASTOLIC BLOOD PRESSURE: 96 MMHG | BODY MASS INDEX: 16.81 KG/M2 | OXYGEN SATURATION: 94 % | HEIGHT: 70 IN | TEMPERATURE: 97.6 F | SYSTOLIC BLOOD PRESSURE: 130 MMHG | RESPIRATION RATE: 18 BRPM | WEIGHT: 117.4 LBS | HEART RATE: 73 BPM

## 2022-10-25 DIAGNOSIS — M51.16 LUMBAR DISC HERNIATION WITH RADICULOPATHY: ICD-10-CM

## 2022-10-25 DIAGNOSIS — M51.36 DDD (DEGENERATIVE DISC DISEASE), LUMBAR: ICD-10-CM

## 2022-10-25 DIAGNOSIS — M54.50 LOW BACK PAIN: ICD-10-CM

## 2022-10-25 PROCEDURE — 72120 X-RAY BEND ONLY L-S SPINE: CPT

## 2022-10-25 PROCEDURE — 72240 MYELOGRAPHY NECK SPINE: CPT

## 2022-10-25 PROCEDURE — 72132 CT LUMBAR SPINE W/DYE: CPT

## 2022-10-25 PROCEDURE — 62304 MYELOGRAPHY LUMBAR INJECTION: CPT

## 2022-10-25 PROCEDURE — 0 IOPAMIDOL 41 % SOLUTION: Performed by: NEUROLOGICAL SURGERY

## 2022-10-25 PROCEDURE — 62284 INJECTION FOR MYELOGRAM: CPT | Performed by: NEUROLOGICAL SURGERY

## 2022-10-25 RX ORDER — LIDOCAINE HYDROCHLORIDE 10 MG/ML
5 INJECTION, SOLUTION EPIDURAL; INFILTRATION; INTRACAUDAL; PERINEURAL ONCE
Status: DISCONTINUED | OUTPATIENT
Start: 2022-10-25 | End: 2022-10-26 | Stop reason: HOSPADM

## 2022-10-25 RX ORDER — ONDANSETRON 4 MG/1
4 TABLET, FILM COATED ORAL ONCE AS NEEDED
Status: DISCONTINUED | OUTPATIENT
Start: 2022-10-25 | End: 2022-10-26 | Stop reason: HOSPADM

## 2022-10-25 RX ORDER — PROMETHAZINE HYDROCHLORIDE 25 MG/1
25 TABLET ORAL ONCE AS NEEDED
Status: DISCONTINUED | OUTPATIENT
Start: 2022-10-25 | End: 2022-10-26 | Stop reason: HOSPADM

## 2022-10-25 RX ADMIN — IOPAMIDOL 20 ML: 408 INJECTION, SOLUTION INTRATHECAL at 07:43

## 2022-10-25 NOTE — POST-PROCEDURE NOTE
MYELOGRAM PROCEDURE NOTE  Neurosurgery    Patient: Edd Bautista  : 1950      PreOp Diagnosis: Lumbar radiculopathy    PostOp Diagnosis: Same    Procedure: Lumbar myelogram    Surgeon: Jarrett    Anesthesia: 1% lidocaine    Technique:   Spinal needle: 22 gague   Contrast: Isovue 200 (20ml)   Injection site: L3    EBL: Trace    Specimens removed: None    Complication: None        Noah Farias MD  10/25/22  07:24 EDT

## 2022-10-27 ENCOUNTER — TELEPHONE (OUTPATIENT)
Dept: INFUSION THERAPY | Facility: HOSPITAL | Age: 72
End: 2022-10-27

## 2022-10-27 DIAGNOSIS — M47.816 LUMBAR FACET ARTHROPATHY: ICD-10-CM

## 2022-10-27 DIAGNOSIS — M51.16 LUMBAR DISC HERNIATION WITH RADICULOPATHY: ICD-10-CM

## 2022-10-27 DIAGNOSIS — M51.36 DDD (DEGENERATIVE DISC DISEASE), LUMBAR: ICD-10-CM

## 2022-10-27 RX ORDER — GABAPENTIN 300 MG/1
300 CAPSULE ORAL 4 TIMES DAILY
Qty: 50 CAPSULE | Refills: 1 | Status: SHIPPED | OUTPATIENT
Start: 2022-10-27

## 2022-10-27 NOTE — TELEPHONE ENCOUNTER
Caller: Lucrecia Bautista    Relationship: Emergency Contact    Best call back number: [phone]    Requested Prescriptions:   Requested Prescriptions     Pending Prescriptions Disp Refills   • gabapentin (NEURONTIN) 300 MG capsule 50 capsule 1     Sig: Take 1 capsule by mouth 4 (Four) Times a Day.        Pharmacy where request should be sent: [facility]     Additional details provided by patient: PT WIFE STATES  PT HAS NONE. F/U VISIT 11-1-22    Does the patient have less than a 3 day supply:  [x] Yes  [] No    Shirin Ramos Rep   10/27/22 08:58 EDT         PLEASE CALL PT ONCE ORDER SENT.     THANK YOU,

## 2022-10-27 NOTE — TELEPHONE ENCOUNTER
Provider:  Dr. Farias  Surgery/Procedure:  Historical SX 2015 with Zaid.   Surgery/Procedure Date:    Last visit:   10/21/22  Next visit: 11/1/22     Reason for call:  Requested Prescriptions     Pending Prescriptions Disp Refills   • gabapentin (NEURONTIN) 300 MG capsule 50 capsule 1     Sig: Take 1 capsule by mouth 4 (Four) Times a Day.       09/01/2022 Gabapentin 300MG 1950 50 12 [facility] [place] 2  09/23/2022 Tramadol Hydrochloride 50MG 1950 21 7 [facility] -  [address] [place] [address] 15 1  09/28/2022 [facility]entin 300MG 1950 50 12 [facility] [place] [facility]  09/30/2022 Tramadol Hydrochloride 50MG 1950 21 7 [facility] [address] 15 1

## 2022-11-01 ENCOUNTER — OFFICE VISIT (OUTPATIENT)
Dept: NEUROSURGERY | Facility: CLINIC | Age: 72
End: 2022-11-01

## 2022-11-01 VITALS — TEMPERATURE: 97.7 F | WEIGHT: 118.2 LBS | BODY MASS INDEX: 16.92 KG/M2 | HEIGHT: 70 IN

## 2022-11-01 DIAGNOSIS — M51.36 DDD (DEGENERATIVE DISC DISEASE), LUMBAR: ICD-10-CM

## 2022-11-01 DIAGNOSIS — M51.16 LUMBAR DISC HERNIATION WITH RADICULOPATHY: Primary | ICD-10-CM

## 2022-11-01 DIAGNOSIS — Z72.0 TOBACCO ABUSE: ICD-10-CM

## 2022-11-01 DIAGNOSIS — M47.816 LUMBAR FACET ARTHROPATHY: ICD-10-CM

## 2022-11-01 DIAGNOSIS — M51.16 LUMBAR DISC HERNIATION WITH RADICULOPATHY: ICD-10-CM

## 2022-11-01 PROCEDURE — 99213 OFFICE O/P EST LOW 20 MIN: CPT | Performed by: NEUROLOGICAL SURGERY

## 2022-11-01 RX ORDER — TRAMADOL HYDROCHLORIDE 50 MG/1
50 TABLET ORAL EVERY 8 HOURS PRN
Qty: 40 TABLET | Refills: 0 | Status: SHIPPED | OUTPATIENT
Start: 2022-11-01

## 2022-11-01 RX ORDER — TRAMADOL HYDROCHLORIDE 50 MG/1
TABLET ORAL
Qty: 21 TABLET | OUTPATIENT
Start: 2022-11-01

## 2022-11-01 NOTE — PROGRESS NOTES
Patient: Edd Bautista  : 1950    Primary Care Provider: Mukund Aguillon MD    Requesting Provider: As above        History    Chief Complaint: Increasing low back and left leg pain.    History of Present Illness: Mr. Bautista is a 71-year-old retired  who apparently saw my former colleague Dr. Mar years ago for neck problems.  He had numerous injections performed in his neck.  He has had chronic low back pain that has increased.  Over the last 2 to 3 months he has had an increase in his back pain that extends into the side of his left calf.  Symptoms are worse with walking or even lying on his back.  There is really no position in which he can get comfortable.  He has chronically had some numbness in his feet greater on the left.  3 weeks of physical therapy has not helped.  He has had 2 rounds of injections to no avail.  He continues on gabapentin which helps with his neuropathy but not his radicular leg pain.    Review of Systems   Constitutional: Negative for activity change, appetite change, chills, diaphoresis, fatigue, fever and unexpected weight change.   HENT: Positive for tinnitus. Negative for congestion, dental problem, drooling, ear discharge, ear pain, facial swelling, hearing loss, mouth sores, nosebleeds, postnasal drip, rhinorrhea, sinus pressure, sinus pain, sneezing, sore throat, trouble swallowing and voice change.    Eyes: Negative for photophobia, pain, discharge, redness, itching and visual disturbance.   Respiratory: Negative for apnea, cough, choking, chest tightness, shortness of breath, wheezing and stridor.    Cardiovascular: Negative for chest pain, palpitations and leg swelling.   Gastrointestinal: Negative for abdominal distention, abdominal pain, anal bleeding, blood in stool, constipation, diarrhea, nausea, rectal pain and vomiting.   Endocrine: Negative for cold intolerance, heat intolerance, polydipsia, polyphagia and polyuria.   Genitourinary: Negative for decreased  "urine volume, difficulty urinating, dysuria, enuresis, flank pain, frequency, genital sores, hematuria, penile discharge, penile pain, penile swelling, scrotal swelling, testicular pain and urgency.   Musculoskeletal: Positive for back pain and gait problem. Negative for arthralgias, joint swelling, myalgias, neck pain and neck stiffness.   Skin: Negative for color change, pallor, rash and wound.   Allergic/Immunologic: Negative for environmental allergies, food allergies and immunocompromised state.   Neurological: Negative for dizziness, tremors, seizures, syncope, facial asymmetry, speech difficulty, weakness, light-headedness, numbness and headaches.   Hematological: Negative for adenopathy. Does not bruise/bleed easily.   Psychiatric/Behavioral: Negative for agitation, behavioral problems, confusion, decreased concentration, dysphoric mood, hallucinations, self-injury, sleep disturbance and suicidal ideas. The patient is not nervous/anxious and is not hyperactive.        The patient's past medical history, past surgical history, family history, and social history have been reviewed at length in the electronic medical record.      Physical Exam:   Temp 97.7 °F (36.5 °C)   Ht 177.8 cm (70\")   Wt 53.6 kg (118 lb 3.2 oz)   BMI 16.96 kg/m²   MUSCULOSKELETAL:  Straight leg raising is negative.  Julio's Sign is negative.  ROM in the low back is normal.  Tenderness in the back to palpation is not observed.  NEUROLOGICAL:  Strength is intact in the lower extremities to direct testing.  Muscle tone is normal throughout.  Station and gait are normal.  Sensation is intact to light touch testing throughout.    Medical Decision Making    Data Review:   (All imaging studies were personally reviewed unless stated otherwise)  Lumbar CT myelogram demonstrates moderate stenosis in the recesses and foramina at L3-4.    Once again his MRI study of the lumbar spine dated 5/5/2022 demonstrates some diffuse degenerative disc " disease.  There is some foraminal to extraforaminal disc protrusion that could potentially compromise the left L5 nerve root.    Diagnosis:   Left L5 radiculopathy most likely emanating from the foraminal to extraforaminal level at L5-S1.    Treatment Options:   Simple decompression is likely to destabilize his spine given the amount of facet that would need to be removed to free up the L5 nerve root.  Thus decompression accompanied by fusion and stabilization would be necessary.  He smokes significantly.  I had a lengthy discussion with the patient.  If he can stop smoking then I would push ahead with L5-S1 fusion.  Outside of that I do not have a real good option for him.       Diagnosis Plan   1. Lumbar disc herniation with radiculopathy        2. DDD (degenerative disc disease), lumbar        3. Tobacco abuse            Scribed for Noah Farias MD by LAKIA Rowan 11/1/2022 15:42 EDT      I, Dr. Farias, personally performed the services described in the documentation, as scribed in my presence, and it is both accurate and complete.

## 2024-11-05 ENCOUNTER — TRANSCRIBE ORDERS (OUTPATIENT)
Dept: LAB | Facility: HOSPITAL | Age: 74
End: 2024-11-05
Payer: MEDICARE

## 2024-11-05 ENCOUNTER — LAB (OUTPATIENT)
Dept: LAB | Facility: HOSPITAL | Age: 74
End: 2024-11-05
Payer: MEDICARE

## 2024-11-05 ENCOUNTER — TRANSCRIBE ORDERS (OUTPATIENT)
Dept: CARDIOLOGY | Facility: HOSPITAL | Age: 74
End: 2024-11-05
Payer: MEDICARE

## 2024-11-05 ENCOUNTER — HOSPITAL ENCOUNTER (OUTPATIENT)
Dept: CARDIOLOGY | Facility: HOSPITAL | Age: 74
Discharge: HOME | End: 2024-11-05
Payer: MEDICARE

## 2024-11-05 DIAGNOSIS — Z01.812 PRE-OPERATIVE LABORATORY EXAMINATION: Primary | ICD-10-CM

## 2024-11-05 DIAGNOSIS — Z01.812 PRE-OPERATIVE LABORATORY EXAMINATION: ICD-10-CM

## 2024-11-05 LAB
DEPRECATED RDW RBC AUTO: 44.2 FL (ref 37–54)
ERYTHROCYTE [DISTWIDTH] IN BLOOD BY AUTOMATED COUNT: 12.6 % (ref 12.3–15.4)
HCT VFR BLD AUTO: 40.8 % (ref 37.5–51)
HGB BLD-MCNC: 14 G/DL (ref 13–17.7)
MCH RBC QN AUTO: 32.9 PG (ref 26.6–33)
MCHC RBC AUTO-ENTMCNC: 34.3 G/DL (ref 31.5–35.7)
MCV RBC AUTO: 95.8 FL (ref 79–97)
PLATELET # BLD AUTO: 187 10*3/MM3 (ref 140–450)
PMV BLD AUTO: 9.5 FL (ref 6–12)
RBC # BLD AUTO: 4.26 10*6/MM3 (ref 4.14–5.8)
WBC NRBC COR # BLD AUTO: 5.71 10*3/MM3 (ref 3.4–10.8)

## 2024-11-05 PROCEDURE — 36415 COLL VENOUS BLD VENIPUNCTURE: CPT

## 2024-11-05 PROCEDURE — 80053 COMPREHEN METABOLIC PANEL: CPT

## 2024-11-05 PROCEDURE — 93005 ELECTROCARDIOGRAM TRACING: CPT | Performed by: OTOLARYNGOLOGY

## 2024-11-05 PROCEDURE — 85027 COMPLETE CBC AUTOMATED: CPT

## 2024-11-06 LAB
ALBUMIN SERPL-MCNC: 4.1 G/DL (ref 3.5–5.2)
ALBUMIN/GLOB SERPL: 1.2 G/DL
ALP SERPL-CCNC: 68 U/L (ref 39–117)
ALT SERPL W P-5'-P-CCNC: 19 U/L (ref 1–41)
ANION GAP SERPL CALCULATED.3IONS-SCNC: 10.3 MMOL/L (ref 5–15)
AST SERPL-CCNC: 31 U/L (ref 1–40)
BILIRUB SERPL-MCNC: 0.7 MG/DL (ref 0–1.2)
BUN SERPL-MCNC: 12 MG/DL (ref 8–23)
BUN/CREAT SERPL: 11.9 (ref 7–25)
CALCIUM SPEC-SCNC: 9.5 MG/DL (ref 8.6–10.5)
CHLORIDE SERPL-SCNC: 102 MMOL/L (ref 98–107)
CO2 SERPL-SCNC: 26.7 MMOL/L (ref 22–29)
CREAT SERPL-MCNC: 1.01 MG/DL (ref 0.76–1.27)
EGFRCR SERPLBLD CKD-EPI 2021: 79.5 ML/MIN/1.73
GLOBULIN UR ELPH-MCNC: 3.3 GM/DL
GLUCOSE SERPL-MCNC: 85 MG/DL (ref 65–99)
POTASSIUM SERPL-SCNC: 4.4 MMOL/L (ref 3.5–5.2)
PROT SERPL-MCNC: 7.4 G/DL (ref 6–8.5)
QT INTERVAL: 400 MS
QTC INTERVAL: 406 MS
SODIUM SERPL-SCNC: 139 MMOL/L (ref 136–145)